# Patient Record
Sex: MALE | Race: BLACK OR AFRICAN AMERICAN | NOT HISPANIC OR LATINO | Employment: OTHER | URBAN - METROPOLITAN AREA
[De-identification: names, ages, dates, MRNs, and addresses within clinical notes are randomized per-mention and may not be internally consistent; named-entity substitution may affect disease eponyms.]

---

## 2017-04-04 ENCOUNTER — TELEPHONE (OUTPATIENT)
Dept: FAMILY MEDICINE CLINIC | Facility: CLINIC | Age: 47
End: 2017-04-04

## 2017-04-04 DIAGNOSIS — I10 ESSENTIAL HYPERTENSION: ICD-10-CM

## 2017-04-04 RX ORDER — AMLODIPINE BESYLATE 5 MG/1
5 TABLET ORAL DAILY
Qty: 90 TABLET | Refills: 1 | Status: SHIPPED | OUTPATIENT
Start: 2017-04-04 | End: 2017-12-13 | Stop reason: SDUPTHER

## 2017-04-04 RX ORDER — CHLORTHALIDONE 25 MG/1
25 TABLET ORAL DAILY
Qty: 90 TABLET | Refills: 1 | Status: SHIPPED | OUTPATIENT
Start: 2017-04-04 | End: 2017-12-13 | Stop reason: SDUPTHER

## 2017-04-04 NOTE — TELEPHONE ENCOUNTER
----- Message from Cal Duggan sent at 4/4/2017  1:20 PM EDT -----  Regarding: med refill  Contact: [phone]  amLODIPine (NORVASC) 5 MG tablet     chlorthalidone (HYGROTEN) 25 MG tablet  Needs refill on these please call and speak with him    LVM for pt to return call  Medication was sent to pharmacy as requested

## 2017-08-24 ENCOUNTER — OFFICE VISIT (OUTPATIENT)
Dept: FAMILY MEDICINE CLINIC | Facility: CLINIC | Age: 47
End: 2017-08-24

## 2017-08-24 VITALS
DIASTOLIC BLOOD PRESSURE: 82 MMHG | HEART RATE: 68 BPM | HEIGHT: 70 IN | BODY MASS INDEX: 24.34 KG/M2 | SYSTOLIC BLOOD PRESSURE: 122 MMHG | WEIGHT: 170 LBS | OXYGEN SATURATION: 96 % | RESPIRATION RATE: 18 BRPM

## 2017-08-24 DIAGNOSIS — K29.30 CHRONIC SUPERFICIAL GASTRITIS WITHOUT BLEEDING: Primary | ICD-10-CM

## 2017-08-24 PROCEDURE — 83013 H PYLORI (C-13) BREATH: CPT | Performed by: FAMILY MEDICINE

## 2017-08-24 PROCEDURE — 99213 OFFICE O/P EST LOW 20 MIN: CPT | Performed by: FAMILY MEDICINE

## 2017-08-24 RX ORDER — MEFLOQUINE HYDROCHLORIDE 250 MG/1
TABLET ORAL
COMMUNITY
Start: 2017-06-12 | End: 2017-08-24

## 2017-08-24 RX ORDER — OMEPRAZOLE 40 MG/1
CAPSULE, DELAYED RELEASE ORAL
Qty: 30 CAPSULE | Refills: 1 | Status: SHIPPED | OUTPATIENT
Start: 2017-08-24 | End: 2018-11-29

## 2017-08-24 NOTE — PROGRESS NOTES
"Subjective   Trent Lemus is a 47 y.o. male.     History of Present Illness   He is accompanied by his wife.  He describes > 6 months of intermittent epigastric discomfort characterized as a \"burning.\"  He has not been able to attribute his symptom to diet choices or activities.  He denies crescendo abdominal pain, n/v/d, melena or jaundice.  He denies associated reflux, dysphagia or odynophagia.  He reports the indigestion lasts a couple of hours.  He has not tried any over the counter acid reducers.  He reports no excessive alcohol intake.  He works out on a daily basis.  He denies retrosternal chest pain, dyspnea, diaphoresis, racing heart beat or pedal edema.    Review of Systems   Constitutional: Negative for chills, diaphoresis and fever.   HENT: Negative for nosebleeds.    Respiratory: Negative for cough and shortness of breath.    Cardiovascular: Negative for chest pain, palpitations and leg swelling.   Gastrointestinal: Negative for abdominal distention, blood in stool, constipation, diarrhea, nausea and vomiting.   Endocrine: Negative for polydipsia, polyphagia and polyuria.   Genitourinary: Negative for difficulty urinating and hematuria.   Musculoskeletal: Negative for arthralgias.   Skin: Negative for rash.   Neurological: Negative for light-headedness and headaches.   Hematological: Does not bruise/bleed easily.   Psychiatric/Behavioral: The patient is not nervous/anxious.        Objective   Physical Exam   Constitutional: He is oriented to person, place, and time. He appears well-developed and well-nourished.   HENT:   Head: Normocephalic and atraumatic.   Eyes: Conjunctivae are normal.   Neck: Neck supple.   Cardiovascular: Normal rate, regular rhythm and normal heart sounds.    Pulmonary/Chest: Effort normal and breath sounds normal.   Abdominal: Soft. Bowel sounds are normal. He exhibits no distension. There is no tenderness. There is no rebound and no guarding.   No epigastric pain illicited with " deep palpation today.   Musculoskeletal: Normal range of motion.   Neurological: He is alert and oriented to person, place, and time.   Skin: Skin is warm and dry.   Psychiatric: He has a normal mood and affect. His behavior is normal. Judgment and thought content normal.   Nursing note and vitals reviewed.      Assessment/Plan   Diagnoses and all orders for this visit:    Chronic superficial gastritis without bleeding  -     H. Pylori Breath Test  -     omeprazole (priLOSEC) 40 MG capsule; Take 1 cap po once daily  - Fiber > 30 grams daily  - Food and symptom diary  - Go to the ED with any sudden changes or concerns.  - RTC in 3 weeks for re-evaluation.

## 2017-08-28 LAB
UREA BREATH TEST QL: NORMAL
UREA BREATH TEST QL: POSITIVE

## 2017-09-29 ENCOUNTER — OFFICE VISIT (OUTPATIENT)
Dept: FAMILY MEDICINE CLINIC | Facility: CLINIC | Age: 47
End: 2017-09-29

## 2017-09-29 VITALS
WEIGHT: 166.8 LBS | HEIGHT: 70 IN | BODY MASS INDEX: 23.88 KG/M2 | HEART RATE: 53 BPM | SYSTOLIC BLOOD PRESSURE: 116 MMHG | RESPIRATION RATE: 18 BRPM | OXYGEN SATURATION: 98 % | DIASTOLIC BLOOD PRESSURE: 80 MMHG

## 2017-09-29 DIAGNOSIS — K29.70 HELICOBACTER PYLORI GASTRITIS: ICD-10-CM

## 2017-09-29 DIAGNOSIS — B96.81 HELICOBACTER PYLORI GASTRITIS: ICD-10-CM

## 2017-09-29 DIAGNOSIS — Z00.00 HEALTHCARE MAINTENANCE: Primary | ICD-10-CM

## 2017-09-29 LAB
25(OH)D3 SERPL-MCNC: 27.8 NG/ML
ALBUMIN SERPL-MCNC: 4.4 G/DL (ref 3.2–4.8)
ALBUMIN/GLOB SERPL: 1.5 G/DL (ref 1.5–2.5)
ALP SERPL-CCNC: 61 U/L (ref 25–100)
ALT SERPL W P-5'-P-CCNC: 35 U/L (ref 7–40)
ANION GAP SERPL CALCULATED.3IONS-SCNC: 8 MMOL/L (ref 3–11)
ARTICHOKE IGE QN: 133 MG/DL (ref 0–130)
AST SERPL-CCNC: 33 U/L (ref 0–33)
BASOPHILS # BLD MANUAL: 0.09 10*3/MM3 (ref 0–0.2)
BASOPHILS NFR BLD AUTO: 2 % (ref 0–1)
BILIRUB BLD-MCNC: NEGATIVE MG/DL
BILIRUB SERPL-MCNC: 1.2 MG/DL (ref 0.3–1.2)
BUN BLD-MCNC: 11 MG/DL (ref 9–23)
BUN/CREAT SERPL: 8.5 (ref 7–25)
CALCIUM SPEC-SCNC: 9.7 MG/DL (ref 8.7–10.4)
CHLORIDE SERPL-SCNC: 97 MMOL/L (ref 99–109)
CHOLEST SERPL-MCNC: 204 MG/DL (ref 0–200)
CLARITY, POC: CLEAR
CO2 SERPL-SCNC: 36 MMOL/L (ref 20–31)
COLOR UR: YELLOW
CREAT BLD-MCNC: 1.3 MG/DL (ref 0.6–1.3)
CRP SERPL-MCNC: <0.01 MG/DL (ref 0–1)
DEPRECATED RDW RBC AUTO: 44.1 FL (ref 37–54)
EOSINOPHIL # BLD MANUAL: 0.09 10*3/MM3 (ref 0.1–0.3)
EOSINOPHIL NFR BLD MANUAL: 2 % (ref 0–3)
ERYTHROCYTE [DISTWIDTH] IN BLOOD BY AUTOMATED COUNT: 13.4 % (ref 11.3–14.5)
GFR SERPL CREATININE-BSD FRML MDRD: 71 ML/MIN/1.73
GLOBULIN UR ELPH-MCNC: 2.9 GM/DL
GLUCOSE BLD-MCNC: 95 MG/DL (ref 70–100)
GLUCOSE UR STRIP-MCNC: NEGATIVE MG/DL
HCT VFR BLD AUTO: 47.2 % (ref 38.9–50.9)
HDLC SERPL-MCNC: 58 MG/DL (ref 40–60)
HGB BLD-MCNC: 15.9 G/DL (ref 13.1–17.5)
HIV1+2 AB SER QL: NORMAL
KETONES UR QL: NEGATIVE
LEUKOCYTE EST, POC: NEGATIVE
LYMPHOCYTES # BLD MANUAL: 2.64 10*3/MM3 (ref 0.6–4.8)
LYMPHOCYTES NFR BLD MANUAL: 2 % (ref 0–12)
LYMPHOCYTES NFR BLD MANUAL: 56 % (ref 24–44)
MCH RBC QN AUTO: 30.5 PG (ref 27–31)
MCHC RBC AUTO-ENTMCNC: 33.7 G/DL (ref 32–36)
MCV RBC AUTO: 90.6 FL (ref 80–99)
MONOCYTES # BLD AUTO: 0.09 10*3/MM3 (ref 0–1)
NEUTROPHILS # BLD AUTO: 1.37 10*3/MM3 (ref 1.5–8.3)
NEUTROPHILS NFR BLD MANUAL: 29 % (ref 41–71)
NITRITE UR-MCNC: NEGATIVE MG/ML
PH UR: 8 [PH] (ref 5–8)
PLAT MORPH BLD: NORMAL
PLATELET # BLD AUTO: 166 10*3/MM3 (ref 150–450)
PMV BLD AUTO: 12.7 FL (ref 6–12)
POTASSIUM BLD-SCNC: 3.8 MMOL/L (ref 3.5–5.5)
PROT SERPL-MCNC: 7.3 G/DL (ref 5.7–8.2)
PROT UR STRIP-MCNC: NEGATIVE MG/DL
PSA SERPL-MCNC: 0.93 NG/ML (ref 0–4)
RBC # BLD AUTO: 5.21 10*6/MM3 (ref 4.2–5.76)
RBC # UR STRIP: NEGATIVE /UL
RBC MORPH BLD: NORMAL
SCAN SLIDE: NORMAL
SODIUM BLD-SCNC: 141 MMOL/L (ref 132–146)
SP GR UR: 1.01 (ref 1–1.03)
TRIGL SERPL-MCNC: 64 MG/DL (ref 0–150)
TSH SERPL DL<=0.05 MIU/L-ACNC: 1.52 MIU/ML (ref 0.35–5.35)
URATE SERPL-MCNC: 5.8 MG/DL (ref 3.7–9.2)
UROBILINOGEN UR QL: NORMAL
VARIANT LYMPHS NFR BLD MANUAL: 9 % (ref 0–5)
WBC MORPH BLD: NORMAL
WBC NRBC COR # BLD: 4.71 10*3/MM3 (ref 3.5–10.8)

## 2017-09-29 PROCEDURE — 85025 COMPLETE CBC W/AUTO DIFF WBC: CPT | Performed by: FAMILY MEDICINE

## 2017-09-29 PROCEDURE — 82306 VITAMIN D 25 HYDROXY: CPT | Performed by: FAMILY MEDICINE

## 2017-09-29 PROCEDURE — 81003 URINALYSIS AUTO W/O SCOPE: CPT | Performed by: FAMILY MEDICINE

## 2017-09-29 PROCEDURE — 84443 ASSAY THYROID STIM HORMONE: CPT | Performed by: FAMILY MEDICINE

## 2017-09-29 PROCEDURE — 85007 BL SMEAR W/DIFF WBC COUNT: CPT | Performed by: FAMILY MEDICINE

## 2017-09-29 PROCEDURE — 99396 PREV VISIT EST AGE 40-64: CPT | Performed by: FAMILY MEDICINE

## 2017-09-29 PROCEDURE — 80053 COMPREHEN METABOLIC PANEL: CPT | Performed by: FAMILY MEDICINE

## 2017-09-29 PROCEDURE — G0432 EIA HIV-1/HIV-2 SCREEN: HCPCS | Performed by: FAMILY MEDICINE

## 2017-09-29 PROCEDURE — 90471 IMMUNIZATION ADMIN: CPT | Performed by: FAMILY MEDICINE

## 2017-09-29 PROCEDURE — 36415 COLL VENOUS BLD VENIPUNCTURE: CPT | Performed by: FAMILY MEDICINE

## 2017-09-29 PROCEDURE — 90686 IIV4 VACC NO PRSV 0.5 ML IM: CPT | Performed by: FAMILY MEDICINE

## 2017-09-29 PROCEDURE — 84153 ASSAY OF PSA TOTAL: CPT | Performed by: FAMILY MEDICINE

## 2017-09-29 PROCEDURE — 84550 ASSAY OF BLOOD/URIC ACID: CPT | Performed by: FAMILY MEDICINE

## 2017-09-29 PROCEDURE — 86140 C-REACTIVE PROTEIN: CPT | Performed by: FAMILY MEDICINE

## 2017-09-29 PROCEDURE — 80061 LIPID PANEL: CPT | Performed by: FAMILY MEDICINE

## 2017-09-29 RX ORDER — LANSOPRAZOLE, AMOXICILLIN, CLARITHROMYCIN 30-500-500
KIT ORAL
Qty: 1 KIT | Refills: 0 | Status: SHIPPED | OUTPATIENT
Start: 2017-09-29 | End: 2017-12-14

## 2017-09-29 NOTE — PROGRESS NOTES
Subjective   Trent Lemus is a 47 y.o. male.     History of Present Illness   He is here for his annual fasting wellness evaluation.  He is amendable to receiving the annual flu shot today.  He is also here to follow up on his positive H. Pylori urea breath test.  He reports improved epigastric discomfort with his prescribed PPI.    Review of Systems   Constitutional: Negative.    HENT: Negative.    Eyes: Negative.    Respiratory: Negative.    Cardiovascular: Negative.    Gastrointestinal: Negative for blood in stool, diarrhea, nausea and vomiting.   Endocrine: Negative.    Genitourinary: Negative.    Musculoskeletal: Negative.    Skin: Negative.    Allergic/Immunologic: Negative.    Neurological: Negative.    Hematological: Negative.    Psychiatric/Behavioral: Negative.        Objective   Physical Exam   Constitutional: He is oriented to person, place, and time. He appears well-developed and well-nourished. He is cooperative.   HENT:   Head: Normocephalic and atraumatic.   Right Ear: Hearing and external ear normal.   Left Ear: Hearing and external ear normal.   Nose: Nose normal.   Mouth/Throat: Uvula is midline, oropharynx is clear and moist and mucous membranes are normal.   Eyes: Conjunctivae and EOM are normal. Pupils are equal, round, and reactive to light. No scleral icterus.   Neck: Trachea normal and normal range of motion. Neck supple. No JVD present. Carotid bruit is not present. No thyromegaly present.   Cardiovascular: Normal rate, regular rhythm, normal heart sounds and intact distal pulses.    Pulmonary/Chest: Effort normal and breath sounds normal.   Abdominal: Soft. Bowel sounds are normal. There is no hepatosplenomegaly. There is no tenderness.   Musculoskeletal: Normal range of motion.   Lymphadenopathy:     He has no cervical adenopathy.   Neurological: He is alert and oriented to person, place, and time. He has normal strength and normal reflexes. No sensory deficit. Gait normal.   Skin: Skin  is warm and dry.   Psychiatric: He has a normal mood and affect. His speech is normal and behavior is normal. Judgment and thought content normal. Cognition and memory are normal.   Nursing note and vitals reviewed.      Assessment/Plan   Diagnoses and all orders for this visit:    Healthcare maintenance  -     POC Urinalysis Dipstick, Automated  -     Comprehensive Metabolic Panel  -     CBC & Differential  -     Lipid Panel  -     TSH  -     Uric Acid  -     C-reactive Protein  -     PSA  -     HIV-1 / O / 2 Ag / Antibody 4th Generation  -     Vitamin D 25 Hydroxy  -     Flu Vaccine Quad PF 3YR+  - Vaccine counseling and current VIS is provided for immunizations administered today.    Helicobacter pylori gastritis  -     CBC & Differential  -     amoxicillin-clarithromycin-lansoprazole (PREVPAC) combo pack; Follow package directions.  - RTC in 3 months for re-evaluation  - Patient education via CDC.gov    The patient is here for a health maintenance visit.  Currently, the patient consumes a healthy diet and has an adequate exercise regimen. Screening lab work is ordered.  Immunizations are updated today.  Advice and education is given regarding nutrition, aerobic exercise, routine dental evaluations, routine eye exams, reproductive health, cardiovascular risk reduction, sunscreen use, self skin examination (annual dermatology evaluations) and seat belt use (general overall safety).  Further recommendations after lab evaluation.  Annual wellness evaluations recommended.

## 2017-12-13 DIAGNOSIS — I10 ESSENTIAL HYPERTENSION: ICD-10-CM

## 2017-12-14 ENCOUNTER — OFFICE VISIT (OUTPATIENT)
Dept: FAMILY MEDICINE CLINIC | Facility: CLINIC | Age: 47
End: 2017-12-14

## 2017-12-14 VITALS
WEIGHT: 171.6 LBS | OXYGEN SATURATION: 98 % | SYSTOLIC BLOOD PRESSURE: 114 MMHG | BODY MASS INDEX: 24.57 KG/M2 | DIASTOLIC BLOOD PRESSURE: 76 MMHG | HEIGHT: 70 IN | HEART RATE: 61 BPM | RESPIRATION RATE: 18 BRPM

## 2017-12-14 DIAGNOSIS — B96.81 HELICOBACTER POSITIVE GASTRITIS: Primary | ICD-10-CM

## 2017-12-14 DIAGNOSIS — K29.70 HELICOBACTER POSITIVE GASTRITIS: Primary | ICD-10-CM

## 2017-12-14 PROCEDURE — 99213 OFFICE O/P EST LOW 20 MIN: CPT | Performed by: FAMILY MEDICINE

## 2017-12-14 PROCEDURE — 83013 H PYLORI (C-13) BREATH: CPT | Performed by: FAMILY MEDICINE

## 2017-12-14 RX ORDER — CHLORTHALIDONE 25 MG/1
25 TABLET ORAL DAILY
Qty: 90 TABLET | Refills: 1 | Status: SHIPPED | OUTPATIENT
Start: 2017-12-14 | End: 2018-07-20 | Stop reason: SDUPTHER

## 2017-12-14 RX ORDER — AMLODIPINE BESYLATE 5 MG/1
5 TABLET ORAL DAILY
Qty: 90 TABLET | Refills: 1 | Status: SHIPPED | OUTPATIENT
Start: 2017-12-14 | End: 2018-07-20 | Stop reason: SDUPTHER

## 2017-12-14 NOTE — PROGRESS NOTES
Subjective   Trent Lemus is a 47 y.o. male.     History of Present Illness   He is here to follow up on his recent diagnosis of Helicobacter pylori gastritis.  He describes compliance with his medication.  He has not taken any PPI over the last 2 weeks.  He describes some intermittent epigastric discomfort.  It last several minutes.  He denies associated reflux, crescendo abdominal pain, dysphagia, odynophagia or melena.  He reports a good appetite.  He is here to recheck his urea breath test.    Review of Systems   Constitutional: Negative for chills and fever.   HENT: Negative for trouble swallowing and voice change.    Respiratory: Negative for cough and stridor.    Cardiovascular: Negative for chest pain.   Gastrointestinal: Positive for nausea. Negative for abdominal pain, blood in stool, diarrhea and vomiting.   Genitourinary: Negative for hematuria.       Objective   Physical Exam   Constitutional: He is oriented to person, place, and time. He appears well-developed and well-nourished.   HENT:   Head: Normocephalic and atraumatic.   Right Ear: Hearing normal.   Left Ear: Hearing normal.   Mouth/Throat: Mucous membranes are normal.   Eyes: Conjunctivae and EOM are normal. Pupils are equal, round, and reactive to light.   Neck: Neck supple. No JVD present. No thyromegaly present.   Cardiovascular: Normal rate, regular rhythm and normal heart sounds.    Pulmonary/Chest: Effort normal and breath sounds normal.   Abdominal: Soft. He exhibits no distension. There is no tenderness. There is no rebound and no guarding.   Musculoskeletal: Normal range of motion.   Neurological: He is alert and oriented to person, place, and time. He has normal strength. No sensory deficit. Gait normal.   Skin: Skin is warm and dry.   Psychiatric: He has a normal mood and affect. His behavior is normal. Judgment and thought content normal. Cognition and memory are normal.   Nursing note and vitals reviewed.      Assessment/Plan    Diagnoses and all orders for this visit:    Helicobacter positive gastritis  -     H. Pylori Breath Test - ordered  - He can start taking Omeprazole 20 mg po once daily  - We discussed a referral to GI if not improved.  - Watch stools for melena  - Further recommendations pending lab result.

## 2017-12-19 LAB — UREA BREATH TEST QL: NEGATIVE

## 2018-04-16 ENCOUNTER — OFFICE VISIT (OUTPATIENT)
Dept: FAMILY MEDICINE CLINIC | Facility: CLINIC | Age: 48
End: 2018-04-16

## 2018-04-16 VITALS
BODY MASS INDEX: 24.08 KG/M2 | HEIGHT: 70 IN | DIASTOLIC BLOOD PRESSURE: 72 MMHG | RESPIRATION RATE: 18 BRPM | TEMPERATURE: 98.4 F | HEART RATE: 64 BPM | SYSTOLIC BLOOD PRESSURE: 116 MMHG | WEIGHT: 168.2 LBS | OXYGEN SATURATION: 98 %

## 2018-04-16 DIAGNOSIS — J06.9 ACUTE URI: Primary | ICD-10-CM

## 2018-04-16 LAB
EXPIRATION DATE: NORMAL
INTERNAL CONTROL: NORMAL
Lab: NORMAL
S PYO AG THROAT QL: NEGATIVE

## 2018-04-16 PROCEDURE — 99213 OFFICE O/P EST LOW 20 MIN: CPT | Performed by: FAMILY MEDICINE

## 2018-04-16 PROCEDURE — 87880 STREP A ASSAY W/OPTIC: CPT | Performed by: FAMILY MEDICINE

## 2018-04-16 RX ORDER — AZELASTINE 1 MG/ML
SPRAY, METERED NASAL
Qty: 1 EACH | Refills: 0 | Status: SHIPPED | OUTPATIENT
Start: 2018-04-16 | End: 2019-10-31

## 2018-05-07 DIAGNOSIS — J30.1 CHRONIC SEASONAL ALLERGIC RHINITIS DUE TO POLLEN: Primary | ICD-10-CM

## 2018-05-07 RX ORDER — FLUTICASONE PROPIONATE 50 MCG
2 SPRAY, SUSPENSION (ML) NASAL DAILY
Qty: 1 BOTTLE | Refills: 3 | Status: SHIPPED | OUTPATIENT
Start: 2018-05-07 | End: 2018-11-29

## 2018-05-07 RX ORDER — MONTELUKAST SODIUM 10 MG/1
10 TABLET ORAL NIGHTLY
Qty: 30 TABLET | Refills: 3 | Status: SHIPPED | OUTPATIENT
Start: 2018-05-07 | End: 2018-11-29

## 2018-05-07 RX ORDER — LORATADINE 10 MG/1
10 TABLET ORAL DAILY
Qty: 30 TABLET | Refills: 3 | Status: SHIPPED | OUTPATIENT
Start: 2018-05-07 | End: 2018-10-02 | Stop reason: SDUPTHER

## 2018-07-20 DIAGNOSIS — I10 ESSENTIAL HYPERTENSION: ICD-10-CM

## 2018-07-20 RX ORDER — CHLORTHALIDONE 25 MG/1
25 TABLET ORAL DAILY
Qty: 90 TABLET | Refills: 1 | Status: SHIPPED | OUTPATIENT
Start: 2018-07-20 | End: 2018-11-29 | Stop reason: SDUPTHER

## 2018-07-20 RX ORDER — AMLODIPINE BESYLATE 5 MG/1
5 TABLET ORAL DAILY
Qty: 90 TABLET | Refills: 1 | Status: SHIPPED | OUTPATIENT
Start: 2018-07-20 | End: 2018-11-29 | Stop reason: SDUPTHER

## 2018-10-02 DIAGNOSIS — J30.1 CHRONIC SEASONAL ALLERGIC RHINITIS DUE TO POLLEN: ICD-10-CM

## 2018-10-02 RX ORDER — LORATADINE 10 MG/1
10 TABLET ORAL DAILY
Qty: 30 TABLET | Refills: 3 | Status: SHIPPED | OUTPATIENT
Start: 2018-10-02 | End: 2019-09-16

## 2018-11-29 ENCOUNTER — OFFICE VISIT (OUTPATIENT)
Dept: FAMILY MEDICINE CLINIC | Facility: CLINIC | Age: 48
End: 2018-11-29

## 2018-11-29 VITALS
SYSTOLIC BLOOD PRESSURE: 110 MMHG | OXYGEN SATURATION: 98 % | HEART RATE: 60 BPM | TEMPERATURE: 98.4 F | RESPIRATION RATE: 18 BRPM | DIASTOLIC BLOOD PRESSURE: 72 MMHG | HEIGHT: 70 IN | WEIGHT: 172.2 LBS | BODY MASS INDEX: 24.65 KG/M2

## 2018-11-29 DIAGNOSIS — I10 ESSENTIAL HYPERTENSION: ICD-10-CM

## 2018-11-29 DIAGNOSIS — Z00.00 HEALTHCARE MAINTENANCE: Primary | ICD-10-CM

## 2018-11-29 LAB
ALBUMIN SERPL-MCNC: 4.39 G/DL (ref 3.2–4.8)
ALBUMIN/GLOB SERPL: 2 G/DL (ref 1.5–2.5)
ALP SERPL-CCNC: 50 U/L (ref 25–100)
ALT SERPL W P-5'-P-CCNC: 35 U/L (ref 7–40)
ANION GAP SERPL CALCULATED.3IONS-SCNC: 6 MMOL/L (ref 3–11)
ARTICHOKE IGE QN: 119 MG/DL (ref 0–130)
AST SERPL-CCNC: 36 U/L (ref 0–33)
BASOPHILS # BLD AUTO: 0.01 10*3/MM3 (ref 0–0.2)
BASOPHILS NFR BLD AUTO: 0.2 % (ref 0–1)
BILIRUB BLD-MCNC: NEGATIVE MG/DL
BILIRUB SERPL-MCNC: 1.2 MG/DL (ref 0.3–1.2)
BUN BLD-MCNC: 16 MG/DL (ref 9–23)
BUN/CREAT SERPL: 12.5 (ref 7–25)
CALCIUM SPEC-SCNC: 9.5 MG/DL (ref 8.7–10.4)
CHLORIDE SERPL-SCNC: 97 MMOL/L (ref 99–109)
CHOLEST SERPL-MCNC: 184 MG/DL (ref 0–200)
CLARITY, POC: CLEAR
CO2 SERPL-SCNC: 34 MMOL/L (ref 20–31)
COLOR UR: YELLOW
CREAT BLD-MCNC: 1.28 MG/DL (ref 0.6–1.3)
CRP SERPL-MCNC: <0.01 MG/DL (ref 0–1)
DEPRECATED RDW RBC AUTO: 44.2 FL (ref 37–54)
EOSINOPHIL # BLD AUTO: 0.19 10*3/MM3 (ref 0–0.3)
EOSINOPHIL NFR BLD AUTO: 4 % (ref 0–3)
ERYTHROCYTE [DISTWIDTH] IN BLOOD BY AUTOMATED COUNT: 13.4 % (ref 11.3–14.5)
GFR SERPL CREATININE-BSD FRML MDRD: 72 ML/MIN/1.73
GLOBULIN UR ELPH-MCNC: 2.2 GM/DL
GLUCOSE BLD-MCNC: 95 MG/DL (ref 70–100)
GLUCOSE UR STRIP-MCNC: NEGATIVE MG/DL
HBA1C MFR BLD: 6.6 % (ref 4.8–5.6)
HCT VFR BLD AUTO: 48.4 % (ref 38.9–50.9)
HCV AB SER DONR QL: NORMAL
HDLC SERPL-MCNC: 52 MG/DL (ref 40–60)
HGB BLD-MCNC: 16.2 G/DL (ref 13.1–17.5)
HIV1+2 AB SER QL: NORMAL
IMM GRANULOCYTES # BLD: 0 10*3/MM3 (ref 0–0.03)
IMM GRANULOCYTES NFR BLD: 0 % (ref 0–0.6)
KETONES UR QL: NEGATIVE
LEUKOCYTE EST, POC: NEGATIVE
LYMPHOCYTES # BLD AUTO: 2.89 10*3/MM3 (ref 0.6–4.8)
LYMPHOCYTES NFR BLD AUTO: 61.5 % (ref 24–44)
MCH RBC QN AUTO: 30.6 PG (ref 27–31)
MCHC RBC AUTO-ENTMCNC: 33.5 G/DL (ref 32–36)
MCV RBC AUTO: 91.3 FL (ref 80–99)
MONOCYTES # BLD AUTO: 0.32 10*3/MM3 (ref 0–1)
MONOCYTES NFR BLD AUTO: 6.8 % (ref 0–12)
NEUTROPHILS # BLD AUTO: 1.29 10*3/MM3 (ref 1.5–8.3)
NEUTROPHILS NFR BLD AUTO: 27.5 % (ref 41–71)
NITRITE UR-MCNC: NEGATIVE MG/ML
PH UR: 7 [PH] (ref 5–8)
PLATELET # BLD AUTO: 185 10*3/MM3 (ref 150–450)
PMV BLD AUTO: 12.9 FL (ref 6–12)
POTASSIUM BLD-SCNC: 3.7 MMOL/L (ref 3.5–5.5)
PROT SERPL-MCNC: 6.6 G/DL (ref 5.7–8.2)
PROT UR STRIP-MCNC: NEGATIVE MG/DL
PSA SERPL-MCNC: 1.15 NG/ML (ref 0–4)
RBC # BLD AUTO: 5.3 10*6/MM3 (ref 4.2–5.76)
RBC # UR STRIP: NEGATIVE /UL
SODIUM BLD-SCNC: 137 MMOL/L (ref 132–146)
SP GR UR: 1.01 (ref 1–1.03)
TRIGL SERPL-MCNC: 52 MG/DL (ref 0–150)
TSH SERPL DL<=0.05 MIU/L-ACNC: 0.96 MIU/ML (ref 0.35–5.35)
URATE SERPL-MCNC: 7 MG/DL (ref 3.7–9.2)
UROBILINOGEN UR QL: NORMAL
WBC NRBC COR # BLD: 4.7 10*3/MM3 (ref 3.5–10.8)

## 2018-11-29 PROCEDURE — 80053 COMPREHEN METABOLIC PANEL: CPT | Performed by: FAMILY MEDICINE

## 2018-11-29 PROCEDURE — 90471 IMMUNIZATION ADMIN: CPT | Performed by: FAMILY MEDICINE

## 2018-11-29 PROCEDURE — 90686 IIV4 VACC NO PRSV 0.5 ML IM: CPT | Performed by: FAMILY MEDICINE

## 2018-11-29 PROCEDURE — 83036 HEMOGLOBIN GLYCOSYLATED A1C: CPT | Performed by: FAMILY MEDICINE

## 2018-11-29 PROCEDURE — 36415 COLL VENOUS BLD VENIPUNCTURE: CPT | Performed by: FAMILY MEDICINE

## 2018-11-29 PROCEDURE — G0432 EIA HIV-1/HIV-2 SCREEN: HCPCS | Performed by: FAMILY MEDICINE

## 2018-11-29 PROCEDURE — 85007 BL SMEAR W/DIFF WBC COUNT: CPT | Performed by: FAMILY MEDICINE

## 2018-11-29 PROCEDURE — 86140 C-REACTIVE PROTEIN: CPT | Performed by: FAMILY MEDICINE

## 2018-11-29 PROCEDURE — 84443 ASSAY THYROID STIM HORMONE: CPT | Performed by: FAMILY MEDICINE

## 2018-11-29 PROCEDURE — 85025 COMPLETE CBC W/AUTO DIFF WBC: CPT | Performed by: FAMILY MEDICINE

## 2018-11-29 PROCEDURE — 81003 URINALYSIS AUTO W/O SCOPE: CPT | Performed by: FAMILY MEDICINE

## 2018-11-29 PROCEDURE — 86803 HEPATITIS C AB TEST: CPT | Performed by: FAMILY MEDICINE

## 2018-11-29 PROCEDURE — 84550 ASSAY OF BLOOD/URIC ACID: CPT | Performed by: FAMILY MEDICINE

## 2018-11-29 PROCEDURE — 93000 ELECTROCARDIOGRAM COMPLETE: CPT | Performed by: FAMILY MEDICINE

## 2018-11-29 PROCEDURE — 99396 PREV VISIT EST AGE 40-64: CPT | Performed by: FAMILY MEDICINE

## 2018-11-29 PROCEDURE — G0103 PSA SCREENING: HCPCS | Performed by: FAMILY MEDICINE

## 2018-11-29 PROCEDURE — 80061 LIPID PANEL: CPT | Performed by: FAMILY MEDICINE

## 2018-11-29 RX ORDER — AMLODIPINE BESYLATE 5 MG/1
5 TABLET ORAL DAILY
Qty: 90 TABLET | Refills: 1 | Status: SHIPPED | OUTPATIENT
Start: 2018-11-29 | End: 2019-09-16 | Stop reason: SDUPTHER

## 2018-11-29 RX ORDER — CHLORTHALIDONE 25 MG/1
25 TABLET ORAL DAILY
Qty: 90 TABLET | Refills: 1 | Status: SHIPPED | OUTPATIENT
Start: 2018-11-29 | End: 2019-09-16 | Stop reason: SDUPTHER

## 2018-11-29 NOTE — PROGRESS NOTES
Subjective   Trent Lemus is a 48 y.o. male.     History of Present Illness   He is here for his annual fasting wellness evaluation.  He is amendable to updating his annual flu shot.  He voices no acute symptoms.  He is needing a refill of his blood pressure medications.  He is tolerating them well with no adverse events.  He reports good blood pressure control.    Review of Systems   Constitutional: Negative.    HENT: Negative.    Eyes: Negative.    Respiratory: Negative.    Cardiovascular: Negative.    Gastrointestinal: Negative.    Endocrine: Negative.    Genitourinary: Negative.    Musculoskeletal: Negative.    Skin: Negative.    Allergic/Immunologic: Negative.    Neurological: Negative.    Hematological: Negative.    Psychiatric/Behavioral: Negative.        Objective   Physical Exam   Constitutional: He is oriented to person, place, and time. He appears well-developed and well-nourished. He is cooperative.   HENT:   Head: Normocephalic and atraumatic.   Right Ear: Hearing and external ear normal.   Left Ear: Hearing and external ear normal.   Nose: Nose normal.   Mouth/Throat: Uvula is midline, oropharynx is clear and moist and mucous membranes are normal.   Eyes: Conjunctivae and EOM are normal. Pupils are equal, round, and reactive to light. No scleral icterus.   Neck: Trachea normal and normal range of motion. Neck supple. No JVD present. Carotid bruit is not present. No thyromegaly present.   Cardiovascular: Normal rate, regular rhythm, normal heart sounds and intact distal pulses.   Pulmonary/Chest: Effort normal and breath sounds normal.   Abdominal: Soft. Bowel sounds are normal. There is no hepatosplenomegaly. There is no tenderness.   Musculoskeletal: Normal range of motion.   Lymphadenopathy:     He has no cervical adenopathy.   Neurological: He is alert and oriented to person, place, and time. He has normal strength and normal reflexes. No sensory deficit. Gait normal.   Skin: Skin is warm and dry.    Psychiatric: He has a normal mood and affect. His speech is normal and behavior is normal. Judgment and thought content normal. Cognition and memory are normal.   Nursing note and vitals reviewed.      ECG 12 Lead  Date/Time: 11/29/2018 10:33 AM  Performed by: Winston Washington MD  Authorized by: Winston Washington MD   Comparison: not compared with previous ECG   Previous ECG: no previous ECG available  Rhythm: sinus bradycardia  Rate: bradycardic  BPM: 54  Conduction: conduction normal  ST Segments: ST segments normal  T Waves: T waves normal  QRS axis: normal  Clinical impression: normal ECG            Assessment/Plan   Diagnoses and all orders for this visit:    Healthcare maintenance  -     POC Urinalysis Dipstick, Automated  -     Comprehensive Metabolic Panel  -     CBC & Differential  -     Lipid Panel  -     TSH  -     Uric Acid  -     C-reactive Protein  -     PSA Screen  -     HIV-1 / O / 2 Ag / Antibody 4th Generation  -     Hepatitis C Antibody  -     Hemoglobin A1c  -     Fluarix/Fluzone/Afluria Quad>6 Months  - Vaccine counseling and current VIS is provided for immunizations administered today.    Hypertension  -     ECG 12 Lead  -     amLODIPine (NORVASC) 5 MG tablet; Take 1 tablet by mouth Daily.  -     chlorthalidone (HYGROTON) 25 MG tablet; Take 1 tablet by mouth Daily.  -     POC Urinalysis Dipstick, Automated  -     Comprehensive Metabolic Panel  -     Lipid Panel  - Healthy heart diet  - Daily aerobic exercise  - Routine blood pressure monitoring  - [place] Heart Disease and Stroke Prevention Task Force pamphlet reviewed and administered.    The patient is here for a health maintenance visit.  Currently, the patient consumes a healthy diet and has an adequate exercise regimen. Screening lab work is ordered.  Immunizations are updated today.  Advice and education is given regarding nutrition, aerobic exercise, routine dental evaluations, routine eye exams, reproductive health, cardiovascular risk  reduction, sunscreen use, self skin examination (annual dermatology evaluations) and seat belt use (general overall safety).  Further recommendations after lab evaluation.  Annual wellness evaluations recommended.

## 2019-09-15 DIAGNOSIS — I10 ESSENTIAL HYPERTENSION: ICD-10-CM

## 2019-09-15 RX ORDER — AMLODIPINE BESYLATE 5 MG/1
TABLET ORAL
Qty: 90 TABLET | Refills: 1 | Status: CANCELLED | OUTPATIENT
Start: 2019-09-15

## 2019-09-16 ENCOUNTER — OFFICE VISIT (OUTPATIENT)
Dept: FAMILY MEDICINE CLINIC | Facility: CLINIC | Age: 49
End: 2019-09-16

## 2019-09-16 VITALS
BODY MASS INDEX: 23.31 KG/M2 | DIASTOLIC BLOOD PRESSURE: 78 MMHG | WEIGHT: 162.8 LBS | TEMPERATURE: 97.9 F | HEIGHT: 70 IN | HEART RATE: 62 BPM | SYSTOLIC BLOOD PRESSURE: 124 MMHG | OXYGEN SATURATION: 98 %

## 2019-09-16 DIAGNOSIS — I10 ESSENTIAL HYPERTENSION: ICD-10-CM

## 2019-09-16 DIAGNOSIS — K57.92 DIVERTICULITIS: Primary | ICD-10-CM

## 2019-09-16 PROCEDURE — 99214 OFFICE O/P EST MOD 30 MIN: CPT | Performed by: FAMILY MEDICINE

## 2019-09-16 RX ORDER — CHLORTHALIDONE 25 MG/1
25 TABLET ORAL DAILY
Qty: 90 TABLET | Refills: 0 | Status: SHIPPED | OUTPATIENT
Start: 2019-09-16 | End: 2019-10-31 | Stop reason: SDUPTHER

## 2019-09-16 RX ORDER — CIPROFLOXACIN 500 MG/1
500 TABLET, FILM COATED ORAL 2 TIMES DAILY
Qty: 20 TABLET | Refills: 0 | Status: SHIPPED | OUTPATIENT
Start: 2019-09-16 | End: 2019-10-31

## 2019-09-16 RX ORDER — METRONIDAZOLE 500 MG/1
500 TABLET ORAL 3 TIMES DAILY
Qty: 30 TABLET | Refills: 0 | Status: SHIPPED | OUTPATIENT
Start: 2019-09-16 | End: 2019-10-31

## 2019-09-16 RX ORDER — AMLODIPINE BESYLATE 5 MG/1
5 TABLET ORAL DAILY
Qty: 90 TABLET | Refills: 0 | Status: SHIPPED | OUTPATIENT
Start: 2019-09-16 | End: 2019-10-31 | Stop reason: SDUPTHER

## 2019-09-16 NOTE — PROGRESS NOTES
"Subjective   Trent Lemus is a 49 y.o. male.     History of Present Illness   The patient describes a couple of days of pain located to the LLQ area.  The patient recalls no injury or trauma.  The patient reports on Saturday night after running a marathon experiencing sharp, colicky pain to his LLQ area.  It radiates to his groin.  He denies associated fever, chills, n/v/d or blood in stool.  He reports going home after the race and eating a large quantity of red beans & rice.  A few hours later the sharp pain started.  It waxed and waned.  He was able to sleep.  The next day he ran three miles without difficulty.  He describes a normal bowel movement.  Later that evening his pain started again but \"not as bad.\"  The pain is rated as a 5/10.   He denies associated dysuria, gross hematuria or skin eruptions. His last colonoscopy was in 2016 with no diverticulosis noted.  Today he is also needing a refill of his blood pressure medications.    Review of Systems   Constitutional: Negative for chills, diaphoresis, fatigue, fever and unexpected weight change.   HENT: Negative for trouble swallowing.    Gastrointestinal: Positive for abdominal pain. Negative for anal bleeding, blood in stool, constipation, diarrhea, nausea and vomiting.   Genitourinary: Negative for difficulty urinating, discharge, dysuria and hematuria.       Objective   Physical Exam   Constitutional: He is oriented to person, place, and time. He appears well-developed and well-nourished.   HENT:   Head: Normocephalic and atraumatic.   Right Ear: Hearing normal.   Left Ear: Hearing normal.   Mouth/Throat: Mucous membranes are normal.   Eyes: Conjunctivae and EOM are normal. Pupils are equal, round, and reactive to light.   Neck: Neck supple. No JVD present. No thyromegaly present.   Cardiovascular: Normal rate, regular rhythm and normal heart sounds.   Pulmonary/Chest: Effort normal and breath sounds normal.   Abdominal: Soft. Bowel sounds are normal. " There is no tenderness.   Musculoskeletal: Normal range of motion.   Neurological: He is alert and oriented to person, place, and time. He has normal strength. No sensory deficit. Gait normal.   Skin: Skin is warm and dry.   Psychiatric: He has a normal mood and affect. His behavior is normal. Judgment and thought content normal. Cognition and memory are normal.   Nursing note and vitals reviewed.      Assessment/Plan   Diagnoses and all orders for this visit:    Diverticulitis  -     ciprofloxacin (CIPRO) 500 MG tablet; Take 1 tablet by mouth 2 (Two) Times a Day.  -     metroNIDAZOLE (FLAGYL) 500 MG tablet; Take 1 tablet by mouth 3 (Three) Times a Day.  - Adequate hydration daily  - Probiotic daily  - Fiber > 35 grams daily  - Go to the ED with any increase in severity or sudden changes.    Hypertension  -     chlorthalidone (HYGROTON) 25 MG tablet; Take 1 tablet by mouth Daily.  -     amLODIPine (NORVASC) 5 MG tablet; Take 1 tablet by mouth Daily.  - Healthy heart diet / DASH diet  - Low dose aspirin once daily  - Low sodium diet  - Daily aerobic exercise > 40' > 3 x / week  - Routine blood pressure monitoring  - [place] Heart Disease and Stroke Prevention Task Force pamphlet reviewed and administered.    RTC in November or sooner for annual fasting wellness evaluation.

## 2019-10-25 DIAGNOSIS — I10 ESSENTIAL HYPERTENSION: ICD-10-CM

## 2019-10-29 RX ORDER — CHLORTHALIDONE 25 MG/1
TABLET ORAL
Qty: 90 TABLET | Refills: 1 | OUTPATIENT
Start: 2019-10-29

## 2019-10-31 ENCOUNTER — OFFICE VISIT (OUTPATIENT)
Dept: FAMILY MEDICINE CLINIC | Facility: CLINIC | Age: 49
End: 2019-10-31

## 2019-10-31 VITALS
OXYGEN SATURATION: 97 % | WEIGHT: 165 LBS | BODY MASS INDEX: 23.62 KG/M2 | RESPIRATION RATE: 18 BRPM | DIASTOLIC BLOOD PRESSURE: 70 MMHG | HEART RATE: 70 BPM | SYSTOLIC BLOOD PRESSURE: 122 MMHG | HEIGHT: 70 IN | TEMPERATURE: 97.5 F

## 2019-10-31 DIAGNOSIS — I10 ESSENTIAL HYPERTENSION: Primary | ICD-10-CM

## 2019-10-31 DIAGNOSIS — Z00.00 HEALTHCARE MAINTENANCE: ICD-10-CM

## 2019-10-31 DIAGNOSIS — Z23 NEED FOR INFLUENZA VACCINATION: ICD-10-CM

## 2019-10-31 PROCEDURE — 90471 IMMUNIZATION ADMIN: CPT | Performed by: NURSE PRACTITIONER

## 2019-10-31 PROCEDURE — 99213 OFFICE O/P EST LOW 20 MIN: CPT | Performed by: NURSE PRACTITIONER

## 2019-10-31 PROCEDURE — 90674 CCIIV4 VAC NO PRSV 0.5 ML IM: CPT | Performed by: NURSE PRACTITIONER

## 2019-10-31 RX ORDER — AMLODIPINE BESYLATE 5 MG/1
5 TABLET ORAL DAILY
Qty: 90 TABLET | Refills: 3 | Status: SHIPPED | OUTPATIENT
Start: 2019-10-31 | End: 2020-12-09 | Stop reason: SDUPTHER

## 2019-10-31 RX ORDER — CHLORTHALIDONE 25 MG/1
25 TABLET ORAL DAILY
Qty: 90 TABLET | Refills: 3 | Status: SHIPPED | OUTPATIENT
Start: 2019-10-31 | End: 2020-12-17 | Stop reason: SDUPTHER

## 2019-10-31 NOTE — PROGRESS NOTES
"Trent Lemus is a 49 y.o. male who presents for follow up of hypertension.    Chief Complaint   Patient presents with   • Establish Care       Hypertension   This is a chronic problem. The current episode started more than 1 year ago. The problem has been gradually improving since onset. The problem is controlled. Pertinent negatives include no anxiety, blurred vision, chest pain, headaches, malaise/fatigue, neck pain, orthopnea, palpitations, peripheral edema, PND, shortness of breath or sweats. There are no associated agents to hypertension. Risk factors for coronary artery disease include male gender. Past treatments include ACE inhibitors, calcium channel blockers and diuretics. Current antihypertension treatment includes calcium channel blockers and diuretics. The current treatment provides significant improvement. There are no compliance problems.          No Known Allergies    Health Maintenance   Topic Date Due   • INFLUENZA VACCINE  08/01/2019   • ZOSTER VACCINE (1 of 2) 12/08/2019 (Originally 5/5/2018)   • ANNUAL PHYSICAL  11/30/2019   • COLONOSCOPY  02/28/2026   • TDAP/TD VACCINES (3 - Td) 07/12/2026        ROS    Review of Systems   Constitutional: Negative for malaise/fatigue.   Eyes: Negative for blurred vision.   Respiratory: Negative for chest tightness and shortness of breath.    Cardiovascular: Negative for chest pain, palpitations, orthopnea, leg swelling and PND.   Musculoskeletal: Negative for neck pain.   All other systems reviewed and are negative.      Vitals:    10/31/19 1511   BP: 122/70   Pulse: 70   Resp: 18   Temp: 97.5 °F (36.4 °C)   TempSrc: Temporal   SpO2: 97%   Weight: 74.8 kg (165 lb)   Height: 177.8 cm (70\")         Current Outpatient Medications:   •  amLODIPine (NORVASC) 5 MG tablet, Take 1 tablet by mouth Daily., Disp: 90 tablet, Rfl: 3  •  chlorthalidone (HYGROTON) 25 MG tablet, Take 1 tablet by mouth Daily., Disp: 90 tablet, Rfl: 3    PE    Physical Exam   Constitutional: He " is oriented to person, place, and time. He appears well-developed and well-nourished.   HENT:   Head: Normocephalic and atraumatic.   Cardiovascular: Normal rate, regular rhythm and normal heart sounds. Exam reveals no gallop and no friction rub.   No murmur heard.  Pulmonary/Chest: Effort normal and breath sounds normal. No stridor. No respiratory distress. He has no wheezes. He has no rales.   Neurological: He is alert and oriented to person, place, and time.   Skin: Skin is warm and dry.   Psychiatric: He has a normal mood and affect. His behavior is normal. Judgment and thought content normal.   Nursing note and vitals reviewed.       A/P    Problem List Items Addressed This Visit     None      Visit Diagnoses     Hypertension    -  Primary    Relevant Medications    chlorthalidone (HYGROTON) 25 MG tablet    amLODIPine (NORVASC) 5 MG tablet    Need for influenza vaccination        Relevant Orders    Flucelvax Quad=>4Years ([id number]) (Completed)    Healthcare maintenance        Relevant Orders    CBC Auto Differential    Comprehensive Metabolic Panel    TSH    Hemoglobin A1c    Lipid Panel With / Chol / HDL Ratio    Vitamin D 1,25 Dihydroxy    Vitamin B12    PSA Screen    Flucelvax Quad=>4Years ([id number]) (Completed)          Plan of care reviewed with patient at the conclusion of today's visit. Education was provided regarding diagnosis, management and any prescribed or recommended OTC medications.  Patient verbalizes understanding of and agreement with management plan.    Return in about 2 months (around 12/31/2019) for Annual.     MARCK Richardson

## 2019-12-02 ENCOUNTER — LAB (OUTPATIENT)
Dept: FAMILY MEDICINE CLINIC | Facility: CLINIC | Age: 49
End: 2019-12-02

## 2019-12-02 DIAGNOSIS — Z00.00 HEALTHCARE MAINTENANCE: ICD-10-CM

## 2019-12-02 LAB
ALBUMIN SERPL-MCNC: 4.6 G/DL (ref 3.5–5.2)
ALBUMIN/GLOB SERPL: 1.4 G/DL
ALP SERPL-CCNC: 47 U/L (ref 39–117)
ALT SERPL W P-5'-P-CCNC: 24 U/L (ref 1–41)
ANION GAP SERPL CALCULATED.3IONS-SCNC: 12.4 MMOL/L (ref 5–15)
AST SERPL-CCNC: 32 U/L (ref 1–40)
BILIRUB SERPL-MCNC: 1.6 MG/DL (ref 0.2–1.2)
BUN BLD-MCNC: 17 MG/DL (ref 6–20)
BUN/CREAT SERPL: 12 (ref 7–25)
CALCIUM SPEC-SCNC: 10 MG/DL (ref 8.6–10.5)
CHLORIDE SERPL-SCNC: 96 MMOL/L (ref 98–107)
CHOLEST SERPL-MCNC: 188 MG/DL (ref 0–200)
CO2 SERPL-SCNC: 32.6 MMOL/L (ref 22–29)
CREAT BLD-MCNC: 1.42 MG/DL (ref 0.76–1.27)
GFR SERPL CREATININE-BSD FRML MDRD: 64 ML/MIN/1.73
GLOBULIN UR ELPH-MCNC: 3.4 GM/DL
GLUCOSE BLD-MCNC: 84 MG/DL (ref 65–99)
HBA1C MFR BLD: 5.8 % (ref 4.8–5.6)
HDLC SERPL QL: 2.94
HDLC SERPL-MCNC: 64 MG/DL (ref 40–60)
LDLC SERPL CALC-MCNC: 114 MG/DL (ref 0–100)
POTASSIUM BLD-SCNC: 3.9 MMOL/L (ref 3.5–5.2)
PROT SERPL-MCNC: 8 G/DL (ref 6–8.5)
PSA SERPL-MCNC: 1.21 NG/ML (ref 0–4)
SODIUM BLD-SCNC: 141 MMOL/L (ref 136–145)
TRIGL SERPL-MCNC: 52 MG/DL (ref 0–150)
TSH SERPL DL<=0.05 MIU/L-ACNC: 1.82 UIU/ML (ref 0.27–4.2)
VIT B12 BLD-MCNC: 962 PG/ML (ref 211–946)
VLDLC SERPL-MCNC: 10.4 MG/DL (ref 5–40)

## 2019-12-02 PROCEDURE — G0103 PSA SCREENING: HCPCS | Performed by: NURSE PRACTITIONER

## 2019-12-02 PROCEDURE — 84443 ASSAY THYROID STIM HORMONE: CPT | Performed by: NURSE PRACTITIONER

## 2019-12-02 PROCEDURE — 82652 VIT D 1 25-DIHYDROXY: CPT | Performed by: NURSE PRACTITIONER

## 2019-12-02 PROCEDURE — 82607 VITAMIN B-12: CPT | Performed by: NURSE PRACTITIONER

## 2019-12-02 PROCEDURE — 85025 COMPLETE CBC W/AUTO DIFF WBC: CPT | Performed by: NURSE PRACTITIONER

## 2019-12-02 PROCEDURE — 83036 HEMOGLOBIN GLYCOSYLATED A1C: CPT | Performed by: NURSE PRACTITIONER

## 2019-12-02 PROCEDURE — 80061 LIPID PANEL: CPT | Performed by: NURSE PRACTITIONER

## 2019-12-02 PROCEDURE — 80053 COMPREHEN METABOLIC PANEL: CPT | Performed by: NURSE PRACTITIONER

## 2019-12-03 LAB
DEPRECATED RDW RBC AUTO: 45.7 FL (ref 37–54)
EOSINOPHIL # BLD MANUAL: 0.12 10*3/MM3 (ref 0–0.4)
EOSINOPHIL NFR BLD MANUAL: 3 % (ref 0.3–6.2)
ERYTHROCYTE [DISTWIDTH] IN BLOOD BY AUTOMATED COUNT: 13.5 % (ref 12.3–15.4)
HCT VFR BLD AUTO: 49.8 % (ref 37.5–51)
HGB BLD-MCNC: 16.3 G/DL (ref 13–17.7)
LYMPHOCYTES # BLD MANUAL: 1.49 10*3/MM3 (ref 0.7–3.1)
LYMPHOCYTES NFR BLD MANUAL: 38 % (ref 19.6–45.3)
LYMPHOCYTES NFR BLD MANUAL: 6 % (ref 5–12)
MCH RBC QN AUTO: 29.8 PG (ref 26.6–33)
MCHC RBC AUTO-ENTMCNC: 32.7 G/DL (ref 31.5–35.7)
MCV RBC AUTO: 91 FL (ref 79–97)
MONOCYTES # BLD AUTO: 0.24 10*3/MM3 (ref 0.1–0.9)
NEUTROPHILS # BLD AUTO: 1.45 10*3/MM3 (ref 1.7–7)
NEUTROPHILS NFR BLD MANUAL: 37 % (ref 42.7–76)
PLAT MORPH BLD: NORMAL
PLATELET # BLD AUTO: 165 10*3/MM3 (ref 140–450)
PMV BLD AUTO: 12.8 FL (ref 6–12)
RBC # BLD AUTO: 5.47 10*6/MM3 (ref 4.14–5.8)
RBC MORPH BLD: NORMAL
VARIANT LYMPHS NFR BLD MANUAL: 16 % (ref 0–5)
WBC MORPH BLD: NORMAL
WBC NRBC COR # BLD: 3.93 10*3/MM3 (ref 3.4–10.8)

## 2019-12-04 LAB — 1,25(OH)2D3 SERPL-MCNC: 37 PG/ML (ref 19.9–79.3)

## 2019-12-05 ENCOUNTER — TELEPHONE (OUTPATIENT)
Dept: FAMILY MEDICINE CLINIC | Facility: CLINIC | Age: 49
End: 2019-12-05

## 2019-12-05 NOTE — TELEPHONE ENCOUNTER
Patient advised of abnormal labs per on call provider and needs to follow up soon to discuss. Advised some abnormal kidney functions. He stated he had seen them in the past for a work up but came out as nothing.

## 2019-12-09 ENCOUNTER — OFFICE VISIT (OUTPATIENT)
Dept: FAMILY MEDICINE CLINIC | Facility: CLINIC | Age: 49
End: 2019-12-09

## 2019-12-09 VITALS
DIASTOLIC BLOOD PRESSURE: 76 MMHG | OXYGEN SATURATION: 99 % | HEART RATE: 72 BPM | BODY MASS INDEX: 23.77 KG/M2 | RESPIRATION RATE: 16 BRPM | TEMPERATURE: 97.7 F | SYSTOLIC BLOOD PRESSURE: 124 MMHG | WEIGHT: 166 LBS | HEIGHT: 70 IN

## 2019-12-09 DIAGNOSIS — I10 ESSENTIAL HYPERTENSION: Primary | ICD-10-CM

## 2019-12-09 PROCEDURE — 99213 OFFICE O/P EST LOW 20 MIN: CPT | Performed by: FAMILY MEDICINE

## 2019-12-09 NOTE — PROGRESS NOTES
Trent Lemus is a 49 y.o. male who presents today for Follow-up (discuss labs)      He is here today to follow up for his most recent lab draw on 12/2/19. He states that he was contacted last thursday about his lab draw and was instructed to follow up in person to discuss these labs, and in particular, an elevation in his creatinine. Overall he states that he is not in distress about his results, but rather is following per request from the Thursday phone call. He reports being well controlled on current therapies and would like to discuss if or when he can cease taking medications for his BP. He states that he is compliant with his current medications and denies new onset A/E. He denies chest pain, nausea, vomiting, SOB or fatigue.        Review of Systems   Constitutional: Negative for activity change, appetite change, chills, diaphoresis, fatigue, fever, unexpected weight gain and unexpected weight loss.   Respiratory: Negative for apnea, cough, choking, chest tightness, shortness of breath, wheezing and stridor.    Cardiovascular: Negative for chest pain, palpitations and leg swelling.   Skin: Negative for color change, dry skin, pallor, rash, skin lesions and bruise.   Neurological: Negative for dizziness, seizures, syncope, speech difficulty, numbness, headache, memory problem and confusion.        The following portions of the patient's history were reviewed and updated as appropriate: allergies, current medications, past family history, past medical history, past social history, past surgical history and problem list.    Current Outpatient Medications on File Prior to Visit   Medication Sig Dispense Refill   • amLODIPine (NORVASC) 5 MG tablet Take 1 tablet by mouth Daily. 90 tablet 3   • chlorthalidone (HYGROTON) 25 MG tablet Take 1 tablet by mouth Daily. 90 tablet 3     No current facility-administered medications on file prior to visit.        No Known Allergies     Visit Vitals  /76   Pulse 72  "  Temp 97.7 °F (36.5 °C)   Resp 16   Ht 177.8 cm (70\")   Wt 75.3 kg (166 lb)   SpO2 99%   BMI 23.82 kg/m²        Physical Exam   Constitutional: He is oriented to person, place, and time. He appears well-developed and well-nourished. No distress.   HENT:   Head: Normocephalic and atraumatic.   Neck: Normal range of motion. Neck supple. No tracheal deviation present. No thyromegaly present.   Cardiovascular: Normal rate, regular rhythm, normal heart sounds and intact distal pulses. Exam reveals no gallop and no friction rub.   No murmur heard.  Pulmonary/Chest: Effort normal and breath sounds normal. No stridor. No respiratory distress. He has no wheezes. He has no rales. He exhibits no tenderness.   Abdominal: Soft. Bowel sounds are normal. He exhibits no distension and no mass. There is no tenderness. There is no rebound and no guarding. No hernia.   Neurological: He is alert and oriented to person, place, and time. Coordination normal.   Skin: Skin is warm and dry. Capillary refill takes less than 2 seconds. No rash noted. He is not diaphoretic. No erythema. No pallor.   Psychiatric: He has a normal mood and affect. His behavior is normal. Judgment and thought content normal.        Results for orders placed or performed in visit on 12/02/19   PSA Screen   Result Value Ref Range    PSA 1.210 0.000 - 4.000 ng/mL   Vitamin B12   Result Value Ref Range    Vitamin B-12 962 (H) 211 - 946 pg/mL   Vitamin D 1,25 Dihydroxy   Result Value Ref Range    1,25-Dihydroxy, Vitamin D 37.0 19.9 - 79.3 pg/mL   Lipid Panel With / Chol / HDL Ratio   Result Value Ref Range    Total Cholesterol 188 0 - 200 mg/dL    Triglycerides 52 0 - 150 mg/dL    HDL Cholesterol 64 (H) 40 - 60 mg/dL    LDL Cholesterol  114 (H) 0 - 100 mg/dL    VLDL Cholesterol 10.4 5 - 40 mg/dL    Chol/HDL Ratio 2.94    Hemoglobin A1c   Result Value Ref Range    Hemoglobin A1C 5.80 (H) 4.80 - 5.60 %   TSH   Result Value Ref Range    TSH 1.820 0.270 - 4.200 uIU/mL "   Comprehensive Metabolic Panel   Result Value Ref Range    Glucose 84 65 - 99 mg/dL    BUN 17 6 - 20 mg/dL    Creatinine 1.42 (H) 0.76 - 1.27 mg/dL    Sodium 141 136 - 145 mmol/L    Potassium 3.9 3.5 - 5.2 mmol/L    Chloride 96 (L) 98 - 107 mmol/L    CO2 32.6 (H) 22.0 - 29.0 mmol/L    Calcium 10.0 8.6 - 10.5 mg/dL    Total Protein 8.0 6.0 - 8.5 g/dL    Albumin 4.60 3.50 - 5.20 g/dL    ALT (SGPT) 24 1 - 41 U/L    AST (SGOT) 32 1 - 40 U/L    Alkaline Phosphatase 47 39 - 117 U/L    Total Bilirubin 1.6 (H) 0.2 - 1.2 mg/dL    eGFR  African Amer 64 >60 mL/min/1.73    Globulin 3.4 gm/dL    A/G Ratio 1.4 g/dL    BUN/Creatinine Ratio 12.0 7.0 - 25.0    Anion Gap 12.4 5.0 - 15.0 mmol/L   CBC Auto Differential   Result Value Ref Range    WBC 3.93 3.40 - 10.80 10*3/mm3    RBC 5.47 4.14 - 5.80 10*6/mm3    Hemoglobin 16.3 13.0 - 17.7 g/dL    Hematocrit 49.8 37.5 - 51.0 %    MCV 91.0 79.0 - 97.0 fL    MCH 29.8 26.6 - 33.0 pg    MCHC 32.7 31.5 - 35.7 g/dL    RDW 13.5 12.3 - 15.4 %    RDW-SD 45.7 37.0 - 54.0 fl    MPV 12.8 (H) 6.0 - 12.0 fL    Platelets 165 140 - 450 10*3/mm3   Manual Differential   Result Value Ref Range    Neutrophil % 37.0 (L) 42.7 - 76.0 %    Lymphocyte % 38.0 19.6 - 45.3 %    Monocyte % 6.0 5.0 - 12.0 %    Eosinophil % 3.0 0.3 - 6.2 %    Atypical Lymphocyte % 16.0 (H) 0.0 - 5.0 %    Neutrophils Absolute 1.45 (L) 1.70 - 7.00 10*3/mm3    Lymphocytes Absolute 1.49 0.70 - 3.10 10*3/mm3    Monocytes Absolute 0.24 0.10 - 0.90 10*3/mm3    Eosinophils Absolute 0.12 0.00 - 0.40 10*3/mm3    RBC Morphology Normal Normal    WBC Morphology Normal Normal    Platelet Morphology Normal Normal        Problems Addressed this Visit        Cardiovascular and Mediastinum    Hypertension - Primary     Hypertension is unchanged.  Continue current treatment regimen.  Blood pressure will be reassessed 1 year.               Return in about 1 year (around 12/9/2020) for Annual.    Parts of this office note have been dictated by voice  recognition software. Grammatical and/or spelling errors may be present.    Marino Ohcoa MD   12/9/2019

## 2019-12-09 NOTE — ASSESSMENT & PLAN NOTE
Hypertension is unchanged.  Continue current treatment regimen.  Blood pressure will be reassessed 1 year.

## 2020-12-09 DIAGNOSIS — I10 ESSENTIAL HYPERTENSION: ICD-10-CM

## 2020-12-09 RX ORDER — AMLODIPINE BESYLATE 5 MG/1
5 TABLET ORAL DAILY
Qty: 90 TABLET | Refills: 3 | Status: SHIPPED | OUTPATIENT
Start: 2020-12-09 | End: 2021-12-23 | Stop reason: SDUPTHER

## 2020-12-17 ENCOUNTER — OFFICE VISIT (OUTPATIENT)
Dept: FAMILY MEDICINE CLINIC | Facility: CLINIC | Age: 50
End: 2020-12-17

## 2020-12-17 VITALS
OXYGEN SATURATION: 99 % | HEART RATE: 61 BPM | DIASTOLIC BLOOD PRESSURE: 74 MMHG | TEMPERATURE: 96.9 F | WEIGHT: 168.6 LBS | BODY MASS INDEX: 24.14 KG/M2 | HEIGHT: 70 IN | RESPIRATION RATE: 14 BRPM | SYSTOLIC BLOOD PRESSURE: 116 MMHG

## 2020-12-17 DIAGNOSIS — Z12.5 SCREENING FOR PROSTATE CANCER: ICD-10-CM

## 2020-12-17 DIAGNOSIS — N52.03 COMBINED ARTERIAL INSUFFICIENCY AND CORPORO-VENOUS OCCLUSIVE ERECTILE DYSFUNCTION: ICD-10-CM

## 2020-12-17 DIAGNOSIS — Z00.00 WELL ADULT EXAM: Primary | ICD-10-CM

## 2020-12-17 DIAGNOSIS — Z23 NEED FOR SHINGLES VACCINE: ICD-10-CM

## 2020-12-17 DIAGNOSIS — I10 ESSENTIAL HYPERTENSION: ICD-10-CM

## 2020-12-17 LAB
ALBUMIN SERPL-MCNC: 4.6 G/DL (ref 3.5–5.2)
ALBUMIN/GLOB SERPL: 1.5 G/DL
ALP SERPL-CCNC: 48 U/L (ref 39–117)
ALT SERPL W P-5'-P-CCNC: 22 U/L (ref 1–41)
ANION GAP SERPL CALCULATED.3IONS-SCNC: 9 MMOL/L (ref 5–15)
AST SERPL-CCNC: 28 U/L (ref 1–40)
BASOPHILS # BLD MANUAL: 0.04 10*3/MM3 (ref 0–0.2)
BASOPHILS NFR BLD AUTO: 1 % (ref 0–1.5)
BILIRUB SERPL-MCNC: 1.5 MG/DL (ref 0–1.2)
BUN SERPL-MCNC: 14 MG/DL (ref 6–20)
BUN/CREAT SERPL: 11.3 (ref 7–25)
BURR CELLS BLD QL SMEAR: ABNORMAL
CALCIUM SPEC-SCNC: 9.7 MG/DL (ref 8.6–10.5)
CHLORIDE SERPL-SCNC: 96 MMOL/L (ref 98–107)
CHOLEST SERPL-MCNC: 180 MG/DL (ref 0–200)
CO2 SERPL-SCNC: 33 MMOL/L (ref 22–29)
CREAT SERPL-MCNC: 1.24 MG/DL (ref 0.76–1.27)
DEPRECATED RDW RBC AUTO: 43.2 FL (ref 37–54)
EOSINOPHIL # BLD MANUAL: 0.3 10*3/MM3 (ref 0–0.4)
EOSINOPHIL NFR BLD MANUAL: 7 % (ref 0.3–6.2)
ERYTHROCYTE [DISTWIDTH] IN BLOOD BY AUTOMATED COUNT: 12.8 % (ref 12.3–15.4)
GFR SERPL CREATININE-BSD FRML MDRD: 74 ML/MIN/1.73
GLOBULIN UR ELPH-MCNC: 3.1 GM/DL
GLUCOSE SERPL-MCNC: 98 MG/DL (ref 65–99)
HBA1C MFR BLD: 5.7 % (ref 4.8–5.6)
HCT VFR BLD AUTO: 50.5 % (ref 37.5–51)
HDLC SERPL-MCNC: 56 MG/DL (ref 40–60)
HGB BLD-MCNC: 16.9 G/DL (ref 13–17.7)
LDLC SERPL CALC-MCNC: 113 MG/DL (ref 0–100)
LDLC/HDLC SERPL: 2 {RATIO}
LYMPHOCYTES # BLD MANUAL: 2.65 10*3/MM3 (ref 0.7–3.1)
LYMPHOCYTES NFR BLD MANUAL: 10 % (ref 5–12)
LYMPHOCYTES NFR BLD MANUAL: 62 % (ref 19.6–45.3)
MCH RBC QN AUTO: 30.7 PG (ref 26.6–33)
MCHC RBC AUTO-ENTMCNC: 33.5 G/DL (ref 31.5–35.7)
MCV RBC AUTO: 91.8 FL (ref 79–97)
MONOCYTES # BLD AUTO: 0.43 10*3/MM3 (ref 0.1–0.9)
NEUTROPHILS # BLD AUTO: 0.85 10*3/MM3 (ref 1.7–7)
NEUTROPHILS NFR BLD MANUAL: 20 % (ref 42.7–76)
PLAT MORPH BLD: NORMAL
PLATELET # BLD AUTO: 147 10*3/MM3 (ref 140–450)
PMV BLD AUTO: 12.7 FL (ref 6–12)
POIKILOCYTOSIS BLD QL SMEAR: ABNORMAL
POTASSIUM SERPL-SCNC: 3.7 MMOL/L (ref 3.5–5.2)
PROT SERPL-MCNC: 7.7 G/DL (ref 6–8.5)
PSA SERPL-MCNC: 1.08 NG/ML (ref 0–4)
RBC # BLD AUTO: 5.5 10*6/MM3 (ref 4.14–5.8)
SODIUM SERPL-SCNC: 138 MMOL/L (ref 136–145)
TRIGL SERPL-MCNC: 59 MG/DL (ref 0–150)
TSH SERPL DL<=0.05 MIU/L-ACNC: 1.68 UIU/ML (ref 0.27–4.2)
VLDLC SERPL-MCNC: 11 MG/DL (ref 5–40)
WBC # BLD AUTO: 4.27 10*3/MM3 (ref 3.4–10.8)
WBC MORPH BLD: NORMAL

## 2020-12-17 PROCEDURE — 99396 PREV VISIT EST AGE 40-64: CPT | Performed by: FAMILY MEDICINE

## 2020-12-17 PROCEDURE — 83036 HEMOGLOBIN GLYCOSYLATED A1C: CPT | Performed by: FAMILY MEDICINE

## 2020-12-17 PROCEDURE — 80053 COMPREHEN METABOLIC PANEL: CPT | Performed by: FAMILY MEDICINE

## 2020-12-17 PROCEDURE — 90472 IMMUNIZATION ADMIN EACH ADD: CPT | Performed by: FAMILY MEDICINE

## 2020-12-17 PROCEDURE — G0103 PSA SCREENING: HCPCS | Performed by: FAMILY MEDICINE

## 2020-12-17 PROCEDURE — 84443 ASSAY THYROID STIM HORMONE: CPT | Performed by: FAMILY MEDICINE

## 2020-12-17 PROCEDURE — 85007 BL SMEAR W/DIFF WBC COUNT: CPT | Performed by: FAMILY MEDICINE

## 2020-12-17 PROCEDURE — 90471 IMMUNIZATION ADMIN: CPT | Performed by: FAMILY MEDICINE

## 2020-12-17 PROCEDURE — 90750 HZV VACC RECOMBINANT IM: CPT | Performed by: FAMILY MEDICINE

## 2020-12-17 PROCEDURE — 85025 COMPLETE CBC W/AUTO DIFF WBC: CPT | Performed by: FAMILY MEDICINE

## 2020-12-17 PROCEDURE — 90686 IIV4 VACC NO PRSV 0.5 ML IM: CPT | Performed by: FAMILY MEDICINE

## 2020-12-17 PROCEDURE — 80061 LIPID PANEL: CPT | Performed by: FAMILY MEDICINE

## 2020-12-17 RX ORDER — MULTIPLE VITAMINS W/ MINERALS TAB 9MG-400MCG
1 TAB ORAL DAILY
COMMUNITY

## 2020-12-17 RX ORDER — ASPIRIN 81 MG/1
81 TABLET ORAL DAILY
COMMUNITY

## 2020-12-17 RX ORDER — SILDENAFIL CITRATE 20 MG/1
20 TABLET ORAL DAILY PRN
Qty: 30 TABLET | Refills: 3 | Status: SHIPPED | OUTPATIENT
Start: 2020-12-17 | End: 2021-12-23 | Stop reason: SDUPTHER

## 2020-12-17 RX ORDER — CHLORTHALIDONE 25 MG/1
25 TABLET ORAL DAILY
Qty: 90 TABLET | Refills: 3 | Status: SHIPPED | OUTPATIENT
Start: 2020-12-17 | End: 2021-12-23 | Stop reason: SDUPTHER

## 2020-12-17 NOTE — PROGRESS NOTES
Trent Lemus is a 50 y.o. male who presents today to complete annual exam.    Chief Complaint   Patient presents with   • Annual Exam        Patient is here to complete annual exam.  Patient is currently being treated for hypertension which is well controlled at this time.  Patient exercises regularly by running since the gym closed.  He eats a varied and healthy diet.  Patient sees dentist and optometrist regularly.  He has not followed consistently with dermatologist.  Patient has no acute complaints today but has noticed a slight decrease in the firmness of his erections over the past several years.  He is interested and discussing possibility of a low-dose of Viagra to improve his erections.  Patient is able to get and maintain an erection as well as ejaculate.  He does feels like his erections are not as firm and do not last as long as they used to.       Review of Systems   Constitutional: Negative for fever and unexpected weight loss.   HENT: Negative for congestion, ear pain and sore throat.    Eyes: Negative for visual disturbance.   Respiratory: Negative for cough, shortness of breath and wheezing.    Cardiovascular: Negative for chest pain and palpitations.   Gastrointestinal: Negative for abdominal pain, blood in stool, constipation, diarrhea, nausea, vomiting and GERD.   Endocrine: Negative for polydipsia and polyuria.   Genitourinary: Positive for erectile dysfunction. Negative for difficulty urinating.   Musculoskeletal: Negative for joint swelling.   Skin: Negative for rash and skin lesions.   Allergic/Immunologic: Negative for environmental allergies.   Neurological: Negative for seizures and syncope.   Hematological: Does not bruise/bleed easily.   Psychiatric/Behavioral: Negative for suicidal ideas.        PHQ-9 Depression Screening  Little interest or pleasure in doing things? 0   Feeling down, depressed, or hopeless? 0   Trouble falling or staying asleep, or sleeping too much?     Feeling tired  or having little energy?     Poor appetite or overeating?     Feeling bad about yourself - or that you are a failure or have let yourself or your family down?     Trouble concentrating on things, such as reading the newspaper or watching television?     Moving or speaking so slowly that other people could have noticed? Or the opposite - being so fidgety or restless that you have been moving around a lot more than usual?     Thoughts that you would be better off dead, or of hurting yourself in some way?     PHQ-9 Total Score 0   If you checked off any problems, how difficult have these problems made it for you to do your work, take care of things at home, or get along with other people?         Past Medical History:   Diagnosis Date   • History of Helicobacter pylori infection    • Seasonal allergies         Past Surgical History:   Procedure Laterality Date   • COLONOSCOPY  2016        Family History   Problem Relation Age of Onset   • No Known Problems Mother    • Stroke Father    • Hypertension Sister    • Hypertension Brother    • No Known Problems Son    • No Known Problems Maternal Grandmother    • No Known Problems Maternal Grandfather    • No Known Problems Paternal Grandmother    • No Known Problems Paternal Grandfather    • No Known Problems Brother    • No Known Problems Brother    • No Known Problems Brother    • No Known Problems Brother    • No Known Problems Brother    • No Known Problems Brother         Social History     Socioeconomic History   • Marital status:      Spouse name: Dori   • Number of children: 1   • Years of education: College   • Highest education level: Bachelor's degree (e.g., BA, AB, BS)   Occupational History   • Occupation: Realty Sales     Employer: SELF-EMPLOYED   Tobacco Use   • Smoking status: Never Smoker   • Smokeless tobacco: Never Used   Substance and Sexual Activity   • Alcohol use: No   • Drug use: No   • Sexual activity: Yes     Partners: Female        Current  "Outpatient Medications on File Prior to Visit   Medication Sig Dispense Refill   • amLODIPine (NORVASC) 5 MG tablet Take 1 tablet by mouth Daily. 90 tablet 3   • aspirin 81 MG EC tablet Take 81 mg by mouth Daily.     • multivitamin with minerals (MULTIVITAMIN ADULT PO) Take 1 tablet by mouth Daily.     • [DISCONTINUED] chlorthalidone (HYGROTON) 25 MG tablet Take 1 tablet by mouth Daily. 90 tablet 3     No current facility-administered medications on file prior to visit.        No Known Allergies     Visit Vitals  /74   Pulse 61   Temp 96.9 °F (36.1 °C) (Temporal)   Resp 14   Ht 177.8 cm (70\")   Wt 76.5 kg (168 lb 9.6 oz)   SpO2 99%   BMI 24.19 kg/m²      Body mass index is 24.19 kg/m².    Physical Exam  Constitutional:       General: He is not in acute distress.     Appearance: He is well-developed. He is not diaphoretic.   HENT:      Head: Atraumatic.   Neck:      Musculoskeletal: Normal range of motion and neck supple.   Cardiovascular:      Rate and Rhythm: Normal rate and regular rhythm.      Heart sounds: Normal heart sounds. No murmur. No friction rub. No gallop.    Pulmonary:      Effort: Pulmonary effort is normal. No respiratory distress.      Breath sounds: Normal breath sounds. No wheezing or rales.   Abdominal:      General: Bowel sounds are normal. There is no distension.      Palpations: Abdomen is soft.      Tenderness: There is no abdominal tenderness.   Skin:     General: Skin is warm and dry.   Neurological:      Mental Status: He is alert and oriented to person, place, and time.   Psychiatric:         Behavior: Behavior normal.          Results for orders placed or performed in visit on 12/02/19   PSA Screen    Specimen: Arm, Right; Blood   Result Value Ref Range    PSA 1.210 0.000 - 4.000 ng/mL   Vitamin B12    Specimen: Arm, Right; Blood   Result Value Ref Range    Vitamin B-12 962 (H) 211 - 946 pg/mL   Vitamin D 1,25 Dihydroxy    Specimen: Arm, Right; Blood   Result Value Ref Range    " 1,25-Dihydroxy, Vitamin D 37.0 19.9 - 79.3 pg/mL   Lipid Panel With / Chol / HDL Ratio    Specimen: Arm, Right; Blood   Result Value Ref Range    Total Cholesterol 188 0 - 200 mg/dL    Triglycerides 52 0 - 150 mg/dL    HDL Cholesterol 64 (H) 40 - 60 mg/dL    LDL Cholesterol  114 (H) 0 - 100 mg/dL    VLDL Cholesterol 10.4 5 - 40 mg/dL    Chol/HDL Ratio 2.94    Hemoglobin A1c    Specimen: Arm, Right; Blood   Result Value Ref Range    Hemoglobin A1C 5.80 (H) 4.80 - 5.60 %   TSH    Specimen: Arm, Right; Blood   Result Value Ref Range    TSH 1.820 0.270 - 4.200 uIU/mL   Comprehensive Metabolic Panel    Specimen: Arm, Right; Blood   Result Value Ref Range    Glucose 84 65 - 99 mg/dL    BUN 17 6 - 20 mg/dL    Creatinine 1.42 (H) 0.76 - 1.27 mg/dL    Sodium 141 136 - 145 mmol/L    Potassium 3.9 3.5 - 5.2 mmol/L    Chloride 96 (L) 98 - 107 mmol/L    CO2 32.6 (H) 22.0 - 29.0 mmol/L    Calcium 10.0 8.6 - 10.5 mg/dL    Total Protein 8.0 6.0 - 8.5 g/dL    Albumin 4.60 3.50 - 5.20 g/dL    ALT (SGPT) 24 1 - 41 U/L    AST (SGOT) 32 1 - 40 U/L    Alkaline Phosphatase 47 39 - 117 U/L    Total Bilirubin 1.6 (H) 0.2 - 1.2 mg/dL    eGFR  African Amer 64 >60 mL/min/1.73    Globulin 3.4 gm/dL    A/G Ratio 1.4 g/dL    BUN/Creatinine Ratio 12.0 7.0 - 25.0    Anion Gap 12.4 5.0 - 15.0 mmol/L   CBC Auto Differential    Specimen: Arm, Right; Blood   Result Value Ref Range    WBC 3.93 3.40 - 10.80 10*3/mm3    RBC 5.47 4.14 - 5.80 10*6/mm3    Hemoglobin 16.3 13.0 - 17.7 g/dL    Hematocrit 49.8 37.5 - 51.0 %    MCV 91.0 79.0 - 97.0 fL    MCH 29.8 26.6 - 33.0 pg    MCHC 32.7 31.5 - 35.7 g/dL    RDW 13.5 12.3 - 15.4 %    RDW-SD 45.7 37.0 - 54.0 fl    MPV 12.8 (H) 6.0 - 12.0 fL    Platelets 165 140 - 450 10*3/mm3   Manual Differential    Specimen: Arm, Right; Blood   Result Value Ref Range    Neutrophil % 37.0 (L) 42.7 - 76.0 %    Lymphocyte % 38.0 19.6 - 45.3 %    Monocyte % 6.0 5.0 - 12.0 %    Eosinophil % 3.0 0.3 - 6.2 %    Atypical Lymphocyte %  16.0 (H) 0.0 - 5.0 %    Neutrophils Absolute 1.45 (L) 1.70 - 7.00 10*3/mm3    Lymphocytes Absolute 1.49 0.70 - 3.10 10*3/mm3    Monocytes Absolute 0.24 0.10 - 0.90 10*3/mm3    Eosinophils Absolute 0.12 0.00 - 0.40 10*3/mm3    RBC Morphology Normal Normal    WBC Morphology Normal Normal    Platelet Morphology Normal Normal        Problems Addressed this Visit        Cardiovascular and Mediastinum    Hypertension    Relevant Medications    chlorthalidone (HYGROTON) 25 MG tablet       Other    Well adult exam - Primary     The patient is here for health maintenance visit.  Currently, the patient consumes a healthy diet and has an adequate exercise regimen.  Screening lab work is ordered.  Immunizations were reviewed today.  Advice and education was given regarding nutrition, aerobic exercise, routine dental evaluations, routine eye exams, reproductive health, cardiovascular risk reduction, sunscreen use, self skin examination (annual dermatology evaluations) and seatbelt use (general overall safety).  Further recommendations will be given if needed after lab evaluation.  Annual wellness evaluation is recommended.           Relevant Orders    CBC & Differential    Comprehensive Metabolic Panel    Hemoglobin A1c    Lipid Panel    PSA Screen    TSH Rfx On Abnormal To Free T4    FluLaval Quad >6 Months (2492-9970)    Screening for prostate cancer    Relevant Orders    PSA Screen    Combined arterial insufficiency and corporo-venous occlusive erectile dysfunction    Relevant Medications    sildenafil (REVATIO) 20 MG tablet      Other Visit Diagnoses     Need for shingles vaccine        Relevant Orders    Shingrix Vaccine      Diagnoses       Codes Comments    Well adult exam    -  Primary ICD-10-CM: Z00.00  ICD-9-CM: V70.0     Screening for prostate cancer     ICD-10-CM: Z12.5  ICD-9-CM: V76.44     Combined arterial insufficiency and corporo-venous occlusive erectile dysfunction     ICD-10-CM: N52.03  ICD-9-CM: 607.84      Hypertension     ICD-10-CM: I10  ICD-9-CM: 401.9     Need for shingles vaccine     ICD-10-CM: Z23  ICD-9-CM: V04.89           Return in about 1 year (around 12/17/2021) for Annual.    Parts of this office note have been dictated by voice recognition software. Grammatical and/or spelling errors may be present.     Marino Ochoa MD  12/17/2020

## 2021-12-23 ENCOUNTER — OFFICE VISIT (OUTPATIENT)
Dept: FAMILY MEDICINE CLINIC | Facility: CLINIC | Age: 51
End: 2021-12-23

## 2021-12-23 VITALS
SYSTOLIC BLOOD PRESSURE: 115 MMHG | DIASTOLIC BLOOD PRESSURE: 80 MMHG | HEART RATE: 64 BPM | BODY MASS INDEX: 23.62 KG/M2 | WEIGHT: 165 LBS | OXYGEN SATURATION: 98 % | HEIGHT: 70 IN | TEMPERATURE: 98.6 F

## 2021-12-23 DIAGNOSIS — Z00.00 WELL ADULT EXAM: Primary | ICD-10-CM

## 2021-12-23 DIAGNOSIS — Z12.5 SCREENING FOR PROSTATE CANCER: ICD-10-CM

## 2021-12-23 DIAGNOSIS — I10 ESSENTIAL HYPERTENSION: ICD-10-CM

## 2021-12-23 DIAGNOSIS — N52.03 COMBINED ARTERIAL INSUFFICIENCY AND CORPORO-VENOUS OCCLUSIVE ERECTILE DYSFUNCTION: ICD-10-CM

## 2021-12-23 DIAGNOSIS — R73.03 PREDIABETES: ICD-10-CM

## 2021-12-23 LAB
BASOPHILS # BLD AUTO: 0.04 10*3/MM3 (ref 0–0.2)
BASOPHILS NFR BLD AUTO: 0.8 % (ref 0–1.5)
DEPRECATED RDW RBC AUTO: 42.6 FL (ref 37–54)
EOSINOPHIL # BLD AUTO: 0.08 10*3/MM3 (ref 0–0.4)
EOSINOPHIL NFR BLD AUTO: 1.6 % (ref 0.3–6.2)
ERYTHROCYTE [DISTWIDTH] IN BLOOD BY AUTOMATED COUNT: 13 % (ref 12.3–15.4)
HBA1C MFR BLD: 5.9 % (ref 4.8–5.6)
HCT VFR BLD AUTO: 49.2 % (ref 37.5–51)
HGB BLD-MCNC: 17 G/DL (ref 13–17.7)
LYMPHOCYTES # BLD AUTO: 2.46 10*3/MM3 (ref 0.7–3.1)
LYMPHOCYTES NFR BLD AUTO: 50.5 % (ref 19.6–45.3)
MCH RBC QN AUTO: 31.2 PG (ref 26.6–33)
MCHC RBC AUTO-ENTMCNC: 34.6 G/DL (ref 31.5–35.7)
MCV RBC AUTO: 90.3 FL (ref 79–97)
MONOCYTES # BLD AUTO: 0.37 10*3/MM3 (ref 0.1–0.9)
MONOCYTES NFR BLD AUTO: 7.6 % (ref 5–12)
NEUTROPHILS NFR BLD AUTO: 1.91 10*3/MM3 (ref 1.7–7)
NEUTROPHILS NFR BLD AUTO: 39.3 % (ref 42.7–76)
PLATELET # BLD AUTO: 158 10*3/MM3 (ref 140–450)
PMV BLD AUTO: 13.4 FL (ref 6–12)
RBC # BLD AUTO: 5.45 10*6/MM3 (ref 4.14–5.8)
WBC NRBC COR # BLD: 4.87 10*3/MM3 (ref 3.4–10.8)

## 2021-12-23 PROCEDURE — 90686 IIV4 VACC NO PRSV 0.5 ML IM: CPT | Performed by: FAMILY MEDICINE

## 2021-12-23 PROCEDURE — 80061 LIPID PANEL: CPT | Performed by: FAMILY MEDICINE

## 2021-12-23 PROCEDURE — 99396 PREV VISIT EST AGE 40-64: CPT | Performed by: FAMILY MEDICINE

## 2021-12-23 PROCEDURE — 83036 HEMOGLOBIN GLYCOSYLATED A1C: CPT | Performed by: FAMILY MEDICINE

## 2021-12-23 PROCEDURE — G0103 PSA SCREENING: HCPCS | Performed by: FAMILY MEDICINE

## 2021-12-23 PROCEDURE — 85025 COMPLETE CBC W/AUTO DIFF WBC: CPT | Performed by: FAMILY MEDICINE

## 2021-12-23 PROCEDURE — 90471 IMMUNIZATION ADMIN: CPT | Performed by: FAMILY MEDICINE

## 2021-12-23 PROCEDURE — 80053 COMPREHEN METABOLIC PANEL: CPT | Performed by: FAMILY MEDICINE

## 2021-12-23 RX ORDER — SILDENAFIL CITRATE 20 MG/1
20 TABLET ORAL DAILY PRN
Qty: 30 TABLET | Refills: 3 | Status: SHIPPED | OUTPATIENT
Start: 2021-12-23 | End: 2022-12-28 | Stop reason: SDUPTHER

## 2021-12-23 RX ORDER — CHLORTHALIDONE 25 MG/1
25 TABLET ORAL DAILY
Qty: 90 TABLET | Refills: 3 | Status: SHIPPED | OUTPATIENT
Start: 2021-12-23 | End: 2022-12-28 | Stop reason: SDUPTHER

## 2021-12-23 RX ORDER — AMLODIPINE BESYLATE 5 MG/1
5 TABLET ORAL DAILY
Qty: 90 TABLET | Refills: 3 | Status: SHIPPED | OUTPATIENT
Start: 2021-12-23 | End: 2022-12-28

## 2021-12-23 NOTE — PROGRESS NOTES
Trent Lemus is a 51 y.o. male who presents today to complete annual exam.    Chief Complaint   Patient presents with   • Annual Exam        Patient is here for annual exam. He has no acute complaints today. He has been exercising by cycling. He eats a varied and healthy diet but has to avoid dairy due to it causing gas. He sees the dentist and optometrist regularly but not the dermatologist.        Review of Systems   Constitutional: Negative for fever and unexpected weight loss.   HENT: Negative for congestion, ear pain and sore throat.    Eyes: Negative for visual disturbance.   Respiratory: Negative for cough, shortness of breath and wheezing.    Cardiovascular: Negative for chest pain and palpitations.   Gastrointestinal: Negative for abdominal pain, blood in stool, constipation, diarrhea, nausea, vomiting and GERD.   Endocrine: Negative for polydipsia and polyuria.   Genitourinary: Negative for difficulty urinating.   Musculoskeletal: Negative for joint swelling.   Skin: Negative for rash and skin lesions.   Allergic/Immunologic: Negative for environmental allergies.   Neurological: Negative for seizures and syncope.   Hematological: Does not bruise/bleed easily.   Psychiatric/Behavioral: Negative for suicidal ideas.        PHQ-9 Depression Screening  Little interest or pleasure in doing things? 0   Feeling down, depressed, or hopeless? 0   Trouble falling or staying asleep, or sleeping too much?     Feeling tired or having little energy?     Poor appetite or overeating?     Feeling bad about yourself - or that you are a failure or have let yourself or your family down?     Trouble concentrating on things, such as reading the newspaper or watching television?     Moving or speaking so slowly that other people could have noticed? Or the opposite - being so fidgety or restless that you have been moving around a lot more than usual?     Thoughts that you would be better off dead, or of hurting yourself in some  way?     PHQ-9 Total Score 0   If you checked off any problems, how difficult have these problems made it for you to do your work, take care of things at home, or get along with other people?         Past Medical History:   Diagnosis Date   • History of Helicobacter pylori infection    • Seasonal allergies         Past Surgical History:   Procedure Laterality Date   • COLONOSCOPY  2016        Family History   Problem Relation Age of Onset   • No Known Problems Mother    • Stroke Father    • Hypertension Sister    • Hypertension Brother    • No Known Problems Son    • No Known Problems Maternal Grandmother    • No Known Problems Maternal Grandfather    • No Known Problems Paternal Grandmother    • No Known Problems Paternal Grandfather    • No Known Problems Brother    • No Known Problems Brother    • No Known Problems Brother    • No Known Problems Brother    • No Known Problems Brother    • No Known Problems Brother         Social History     Socioeconomic History   • Marital status:      Spouse name: Dori   • Number of children: 1   • Years of education: College   • Highest education level: Bachelor's degree (e.g., BA, AB, BS)   Tobacco Use   • Smoking status: Never Smoker   • Smokeless tobacco: Never Used   Substance and Sexual Activity   • Alcohol use: No   • Drug use: No   • Sexual activity: Yes     Partners: Female        Current Outpatient Medications on File Prior to Visit   Medication Sig Dispense Refill   • aspirin 81 MG EC tablet Take 81 mg by mouth Daily.     • multivitamin with minerals (MULTIVITAMIN ADULT PO) Take 1 tablet by mouth Daily.     • [DISCONTINUED] amLODIPine (NORVASC) 5 MG tablet Take 1 tablet by mouth Daily. 90 tablet 3   • [DISCONTINUED] chlorthalidone (HYGROTON) 25 MG tablet Take 1 tablet by mouth Daily. 90 tablet 3   • [DISCONTINUED] sildenafil (REVATIO) 20 MG tablet Take 1 tablet by mouth Daily As Needed (erectile dysfunction). May increase as needed to obtain erection. Do not  "exceed 5 tablets daily. 30 tablet 3     No current facility-administered medications on file prior to visit.       No Known Allergies     Visit Vitals  /80   Pulse 64   Temp 98.6 °F (37 °C)   Ht 177.8 cm (70\")   Wt 74.8 kg (165 lb)   SpO2 98%   BMI 23.68 kg/m²      Body mass index is 23.68 kg/m².    Physical Exam  Constitutional:       General: He is not in acute distress.     Appearance: He is well-developed. He is not diaphoretic.   HENT:      Head: Atraumatic.   Cardiovascular:      Rate and Rhythm: Normal rate and regular rhythm.      Heart sounds: Normal heart sounds. No murmur heard.  No friction rub. No gallop.    Pulmonary:      Effort: Pulmonary effort is normal. No respiratory distress.      Breath sounds: Normal breath sounds. No stridor. No wheezing, rhonchi or rales.   Abdominal:      General: Bowel sounds are normal. There is no distension.      Palpations: Abdomen is soft. There is no mass.      Tenderness: There is no abdominal tenderness. There is no guarding or rebound.      Hernia: No hernia is present.   Musculoskeletal:      Cervical back: Normal range of motion and neck supple.   Skin:     General: Skin is warm and dry.   Neurological:      Mental Status: He is alert and oriented to person, place, and time.   Psychiatric:         Behavior: Behavior normal.               Problems Addressed this Visit        Cardiac and Vasculature    Hypertension    Relevant Medications    amLODIPine (NORVASC) 5 MG tablet    chlorthalidone (HYGROTON) 25 MG tablet       Genitourinary and Reproductive     Screening for prostate cancer    Relevant Orders    PSA Screen    Combined arterial insufficiency and corporo-venous occlusive erectile dysfunction    Relevant Medications    sildenafil (REVATIO) 20 MG tablet       Health Encounters    Well adult exam - Primary     The patient is here for health maintenance visit.  Currently, the patient consumes a healthy diet and has an adequate exercise regimen.  " Screening lab work is ordered.  Immunizations were reviewed today.  Advice and education was given regarding nutrition, aerobic exercise, routine dental evaluations, routine eye exams, reproductive health, cardiovascular risk reduction, sunscreen use, self skin examination (annual dermatology evaluations) and seatbelt use (general overall safety).  Further recommendations will be given if needed after lab evaluation.  Annual wellness evaluation is recommended.           Relevant Orders    FluLaval/Fluarix/Fluzone >6 Months ([id number])    CBC & Differential    Comprehensive Metabolic Panel    Lipid Panel    PSA Screen    Hemoglobin A1c      Other Visit Diagnoses     Prediabetes        Relevant Orders    Hemoglobin A1c      Diagnoses       Codes Comments    Well adult exam    -  Primary ICD-10-CM: Z00.00  ICD-9-CM: V70.0     Hypertension     ICD-10-CM: I10  ICD-9-CM: 401.9     Combined arterial insufficiency and corporo-venous occlusive erectile dysfunction     ICD-10-CM: N52.03  ICD-9-CM: 607.84     Screening for prostate cancer     ICD-10-CM: Z12.5  ICD-9-CM: V76.44     Prediabetes     ICD-10-CM: R73.03  ICD-9-CM: 790.29           Return in about 1 year (around 12/23/2022) for Annual.    Marino Ochoa MD  12/23/2021

## 2021-12-24 LAB
ALBUMIN SERPL-MCNC: 4.6 G/DL (ref 3.5–5.2)
ALBUMIN/GLOB SERPL: 1.4 G/DL
ALP SERPL-CCNC: 52 U/L (ref 39–117)
ALT SERPL W P-5'-P-CCNC: 22 U/L (ref 1–41)
ANION GAP SERPL CALCULATED.3IONS-SCNC: 12.9 MMOL/L (ref 5–15)
AST SERPL-CCNC: 38 U/L (ref 1–40)
BILIRUB SERPL-MCNC: 1.3 MG/DL (ref 0–1.2)
BUN SERPL-MCNC: 12 MG/DL (ref 6–20)
BUN/CREAT SERPL: 10.1 (ref 7–25)
CALCIUM SPEC-SCNC: 10 MG/DL (ref 8.6–10.5)
CHLORIDE SERPL-SCNC: 96 MMOL/L (ref 98–107)
CHOLEST SERPL-MCNC: 173 MG/DL (ref 0–200)
CO2 SERPL-SCNC: 31.1 MMOL/L (ref 22–29)
CREAT SERPL-MCNC: 1.19 MG/DL (ref 0.76–1.27)
GFR SERPL CREATININE-BSD FRML MDRD: 78 ML/MIN/1.73
GLOBULIN UR ELPH-MCNC: 3.3 GM/DL
GLUCOSE SERPL-MCNC: 88 MG/DL (ref 65–99)
HDLC SERPL-MCNC: 60 MG/DL (ref 40–60)
LDLC SERPL CALC-MCNC: 103 MG/DL (ref 0–100)
LDLC/HDLC SERPL: 1.72 {RATIO}
POTASSIUM SERPL-SCNC: 4.2 MMOL/L (ref 3.5–5.2)
PROT SERPL-MCNC: 7.9 G/DL (ref 6–8.5)
PSA SERPL-MCNC: 1.11 NG/ML (ref 0–4)
SODIUM SERPL-SCNC: 140 MMOL/L (ref 136–145)
TRIGL SERPL-MCNC: 49 MG/DL (ref 0–150)
VLDLC SERPL-MCNC: 10 MG/DL (ref 5–40)

## 2022-12-28 ENCOUNTER — OFFICE VISIT (OUTPATIENT)
Dept: FAMILY MEDICINE CLINIC | Facility: CLINIC | Age: 52
End: 2022-12-28

## 2022-12-28 ENCOUNTER — LAB (OUTPATIENT)
Dept: LAB | Facility: HOSPITAL | Age: 52
End: 2022-12-28
Payer: COMMERCIAL

## 2022-12-28 VITALS
WEIGHT: 167.6 LBS | TEMPERATURE: 98.6 F | OXYGEN SATURATION: 97 % | RESPIRATION RATE: 16 BRPM | SYSTOLIC BLOOD PRESSURE: 106 MMHG | HEIGHT: 70 IN | DIASTOLIC BLOOD PRESSURE: 70 MMHG | BODY MASS INDEX: 23.99 KG/M2 | HEART RATE: 63 BPM

## 2022-12-28 DIAGNOSIS — R73.03 PREDIABETES: ICD-10-CM

## 2022-12-28 DIAGNOSIS — I10 ESSENTIAL HYPERTENSION: ICD-10-CM

## 2022-12-28 DIAGNOSIS — Z12.5 SCREENING FOR MALIGNANT NEOPLASM OF PROSTATE: ICD-10-CM

## 2022-12-28 DIAGNOSIS — Z00.00 ENCOUNTER FOR ROUTINE ADULT HEALTH EXAMINATION WITHOUT ABNORMAL FINDINGS: Primary | ICD-10-CM

## 2022-12-28 DIAGNOSIS — N52.03 COMBINED ARTERIAL INSUFFICIENCY AND CORPORO-VENOUS OCCLUSIVE ERECTILE DYSFUNCTION: ICD-10-CM

## 2022-12-28 LAB
ALBUMIN SERPL-MCNC: 4.2 G/DL (ref 3.5–5.2)
ALBUMIN/GLOB SERPL: 1.6 G/DL
ALP SERPL-CCNC: 46 U/L (ref 39–117)
ALT SERPL W P-5'-P-CCNC: 29 U/L (ref 1–41)
ANION GAP SERPL CALCULATED.3IONS-SCNC: 6 MMOL/L (ref 5–15)
AST SERPL-CCNC: 35 U/L (ref 1–40)
BILIRUB SERPL-MCNC: 0.8 MG/DL (ref 0–1.2)
BUN SERPL-MCNC: 18 MG/DL (ref 6–20)
BUN/CREAT SERPL: 14.1 (ref 7–25)
CALCIUM SPEC-SCNC: 9.2 MG/DL (ref 8.6–10.5)
CHLORIDE SERPL-SCNC: 99 MMOL/L (ref 98–107)
CHOLEST SERPL-MCNC: 191 MG/DL (ref 0–200)
CO2 SERPL-SCNC: 32 MMOL/L (ref 22–29)
CREAT SERPL-MCNC: 1.28 MG/DL (ref 0.76–1.27)
EGFRCR SERPLBLD CKD-EPI 2021: 66.5 ML/MIN/1.73
GLOBULIN UR ELPH-MCNC: 2.7 GM/DL
GLUCOSE SERPL-MCNC: 94 MG/DL (ref 65–99)
HBA1C MFR BLD: 5.9 % (ref 4.8–5.6)
HDLC SERPL-MCNC: 55 MG/DL (ref 40–60)
LDLC SERPL CALC-MCNC: 127 MG/DL (ref 0–100)
LDLC/HDLC SERPL: 2.29 {RATIO}
POTASSIUM SERPL-SCNC: 3.9 MMOL/L (ref 3.5–5.2)
PROT SERPL-MCNC: 6.9 G/DL (ref 6–8.5)
PSA SERPL-MCNC: 1.09 NG/ML (ref 0–4)
SODIUM SERPL-SCNC: 137 MMOL/L (ref 136–145)
TRIGL SERPL-MCNC: 50 MG/DL (ref 0–150)
VLDLC SERPL-MCNC: 9 MG/DL (ref 5–40)

## 2022-12-28 PROCEDURE — G0103 PSA SCREENING: HCPCS | Performed by: STUDENT IN AN ORGANIZED HEALTH CARE EDUCATION/TRAINING PROGRAM

## 2022-12-28 PROCEDURE — 80061 LIPID PANEL: CPT | Performed by: STUDENT IN AN ORGANIZED HEALTH CARE EDUCATION/TRAINING PROGRAM

## 2022-12-28 PROCEDURE — 80053 COMPREHEN METABOLIC PANEL: CPT | Performed by: STUDENT IN AN ORGANIZED HEALTH CARE EDUCATION/TRAINING PROGRAM

## 2022-12-28 PROCEDURE — 99396 PREV VISIT EST AGE 40-64: CPT | Performed by: STUDENT IN AN ORGANIZED HEALTH CARE EDUCATION/TRAINING PROGRAM

## 2022-12-28 PROCEDURE — 83036 HEMOGLOBIN GLYCOSYLATED A1C: CPT | Performed by: STUDENT IN AN ORGANIZED HEALTH CARE EDUCATION/TRAINING PROGRAM

## 2022-12-28 RX ORDER — CHLORTHALIDONE 25 MG/1
25 TABLET ORAL DAILY
Qty: 90 TABLET | Refills: 3 | Status: SHIPPED | OUTPATIENT
Start: 2022-12-28

## 2022-12-28 RX ORDER — BROMPHENIRAMINE MALEATE, PSEUDOEPHEDRINE HYDROCHLORIDE, AND DEXTROMETHORPHAN HYDROBROMIDE 2; 30; 10 MG/5ML; MG/5ML; MG/5ML
SYRUP ORAL
COMMUNITY
Start: 2022-11-26 | End: 2022-12-28

## 2022-12-28 RX ORDER — AMOXICILLIN 875 MG/1
875 TABLET, COATED ORAL EVERY 12 HOURS
COMMUNITY
Start: 2022-11-03 | End: 2022-12-28

## 2022-12-28 RX ORDER — EPINEPHRINE 0.3 MG/.3ML
INJECTION SUBCUTANEOUS
COMMUNITY
Start: 2022-09-21

## 2022-12-28 RX ORDER — SILDENAFIL CITRATE 20 MG/1
20 TABLET ORAL DAILY PRN
Qty: 30 TABLET | Refills: 3 | Status: SHIPPED | OUTPATIENT
Start: 2022-12-28

## 2022-12-28 NOTE — PATIENT INSTRUCTIONS
Blood pressure should be less than 130/80.     Stop amlodipine     Recommend second shingrix at pharmacy.

## 2022-12-28 NOTE — PROGRESS NOTES
Male Physical Note      Patient Name: Trent Lemus  : 1970   MRN: [id number]     Subjective    Subjective     Chief Complaint:    Chief Complaint   Patient presents with   • Annual Exam     Physical, right shoulder and left knee popping       History of Present Illness: Trent Lemus is a 52 y.o. male who is here today for their annual health maintenance and physical.      Blood pressure is at goal at home    Past Medical History, Social History, Family History and Care Team were all reviewed with patient and updated as appropriate.     Medications:     Current Outpatient Medications:   •  aspirin 81 MG EC tablet, Take 81 mg by mouth Daily., Disp: , Rfl:   •  chlorthalidone (HYGROTON) 25 MG tablet, Take 1 tablet by mouth Daily., Disp: 90 tablet, Rfl: 3  •  multivitamin with minerals tablet tablet, Take 1 tablet by mouth Daily., Disp: , Rfl:   •  sildenafil (REVATIO) 20 MG tablet, Take 1 tablet by mouth Daily As Needed (erectile dysfunction). May increase as needed to obtain erection. Do not exceed 5 tablets daily., Disp: 30 tablet, Rfl: 3  •  EPINEPHrine (EPIPEN) 0.3 MG/0.3ML solution auto-injector injection, INJECT CONTENTS OF 1 PEN AS A ONE TIME DOSE AS NEEDED FOR ALLERGIC REACTION, Disp: , Rfl:     Allergies:   No Known Allergies    Immunizations:  Td/Tdap(Booster Q 10 yrs):    Flu (Yearly):    Colorectal Screening:     Last Completed Colonoscopy          COLORECTAL CANCER SCREENING (COLONOSCOPY - Every 10 Years) Next due on 2016  SCANNED - COLONOSCOPY            Up-to-date repeat in     Hep C: Up-to-date  PSA(Over age 50): Ordered      Diet/Physical activity: healthy balanced diet. Exercise with spinning class.     Depression: PHQ-2 Depression Screening  Little interest or pleasure in doing things? 0-->not at all   Feeling down, depressed, or hopeless? 0-->not at all   PHQ-2 Total Score 0       Objective   Objective     Physical Exam:  Vital Signs:   Vitals:    22 1036  "  BP: 106/70   BP Location: Left arm   Patient Position: Sitting   Cuff Size: Adult   Pulse: 63   Resp: 16   Temp: 98.6 °F (37 °C)   TempSrc: Temporal   SpO2: 97%   Weight: 76 kg (167 lb 9.6 oz)   Height: 177.8 cm (70\")   PainSc: 0-No pain     Body mass index is 24.05 kg/m².     Physical Exam  Constitutional:       General: He is not in acute distress.     Appearance: He is not ill-appearing.   Cardiovascular:      Rate and Rhythm: Normal rate and regular rhythm.   Pulmonary:      Effort: Pulmonary effort is normal.      Breath sounds: Normal breath sounds.   Neurological:      Mental Status: He is alert.   Psychiatric:         Thought Content: Thought content normal.       small crepitus with movement to the left knee.  No tenderness or swelling to left knee.  Small crepitus with movement to right shoulder.  Normal range of motion and no pain to palpation right shoulder  Procedures    Assessment / Plan      Assessment/Plan:   Diagnoses and all orders for this visit:    1. Encounter for routine adult health examination without abnormal findings (Primary)  -     Comprehensive Metabolic Panel  -     Hemoglobin A1c  -     Lipid Panel    2. Prediabetes  Assessment & Plan:  Discussed healthy diet and exercise. Hand out given. \        3. Hypertension  Assessment & Plan:  Stop amlodipine and chlorthalidone as BP below goal     Orders:  -     chlorthalidone (HYGROTON) 25 MG tablet; Take 1 tablet by mouth Daily.  Dispense: 90 tablet; Refill: 3    4. Screening for malignant neoplasm of prostate  -     PSA Screen    5. Combined arterial insufficiency and corporo-venous occlusive erectile dysfunction  -     sildenafil (REVATIO) 20 MG tablet; Take 1 tablet by mouth Daily As Needed (erectile dysfunction). May increase as needed to obtain erection. Do not exceed 5 tablets daily.  Dispense: 30 tablet; Refill: 3       Recommend Shingrix #2 at pharmacy as he doesn't want today.     Follow Up:   Return in about 3 months (around " 3/28/2023) for Follow-up.    Healthcare Maintenance:   Health maintenance counseling included discussion of healthy diet and physical activity, Dental hygiene, and vaccinations.  Jonaskristyn Mariah voices understanding and acceptance of this advice and will call back with any further questions or concerns. AVS with preventive healthcare tips printed for patient.     Abdias Garcia MD  Family Medicine - [facility]

## 2023-02-07 ENCOUNTER — TELEPHONE (OUTPATIENT)
Dept: FAMILY MEDICINE CLINIC | Facility: CLINIC | Age: 53
End: 2023-02-07

## 2023-02-07 NOTE — TELEPHONE ENCOUNTER
Caller: Trent Lemus    Relationship to patient: Self    Best call back number:[phone]    REQUESTING TO CHANGE HIS PCP FROM DR ASHBY TO DR CASTILLO

## 2023-03-28 ENCOUNTER — OFFICE VISIT (OUTPATIENT)
Dept: FAMILY MEDICINE CLINIC | Facility: CLINIC | Age: 53
End: 2023-03-28
Payer: COMMERCIAL

## 2023-03-28 ENCOUNTER — LAB (OUTPATIENT)
Dept: LAB | Facility: HOSPITAL | Age: 53
End: 2023-03-28
Payer: COMMERCIAL

## 2023-03-28 VITALS
HEART RATE: 73 BPM | WEIGHT: 167 LBS | TEMPERATURE: 98.6 F | DIASTOLIC BLOOD PRESSURE: 80 MMHG | SYSTOLIC BLOOD PRESSURE: 120 MMHG | HEIGHT: 70 IN | OXYGEN SATURATION: 98 % | BODY MASS INDEX: 23.91 KG/M2

## 2023-03-28 DIAGNOSIS — M25.562 LEFT LATERAL KNEE PAIN: ICD-10-CM

## 2023-03-28 DIAGNOSIS — I10 ESSENTIAL HYPERTENSION: Primary | ICD-10-CM

## 2023-03-28 DIAGNOSIS — I10 ESSENTIAL HYPERTENSION: ICD-10-CM

## 2023-03-28 DIAGNOSIS — Z29.8 NEED FOR MALARIA PROPHYLAXIS: ICD-10-CM

## 2023-03-28 PROBLEM — Z29.89 NEED FOR MALARIA PROPHYLAXIS: Status: ACTIVE | Noted: 2023-03-28

## 2023-03-28 PROBLEM — M25.569 ARTHRALGIA OF KNEE: Status: ACTIVE | Noted: 2023-02-08

## 2023-03-28 PROCEDURE — 99214 OFFICE O/P EST MOD 30 MIN: CPT | Performed by: FAMILY MEDICINE

## 2023-03-28 PROCEDURE — 80053 COMPREHEN METABOLIC PANEL: CPT

## 2023-03-28 RX ORDER — ATOVAQUONE AND PROGUANIL HYDROCHLORIDE 250; 100 MG/1; MG/1
TABLET, FILM COATED ORAL
Qty: 23 TABLET | Refills: 0 | Status: SHIPPED | OUTPATIENT
Start: 2023-03-28

## 2023-03-28 NOTE — ASSESSMENT & PLAN NOTE
Hypertension is controlled.  Continue current treatment regimen.  Blood pressure will be reassessed in 3 months.

## 2023-03-28 NOTE — ASSESSMENT & PLAN NOTE
Patient is traveling to [facility] next week.  He was given malarone for malaria prophylaxis which he will begin 2 days before arriving in the country and discontinue 7 days after leaving the country.

## 2023-03-28 NOTE — PROGRESS NOTES
Trent Lemus is a 52 y.o. male who presents today for Hypertension      At last visit patient's blood pressure was low normal so amlodipine was stopped and he chlorthalidone. He has been checking blood pressure at home and has been seeing numbers in the 110s/70-80s. Occasionally at night he sees numbers in the 130/80 range. He has not seen higher blood pressures or pressures below 100/60. His lowest reading is 106/74. He has been taking medication as prescribed. He denies side effects from medication. He has been exercising regularly cycling and running. He eats a varied and generally healthy diet. Patient plays [employer] and after he played he stretched as he usually does and the next day he had pain in the lateral side of the left knee. He did not have an injury to the knee. He was seen by ortho and they did an x-ray which was normal. They gave him diclofenac gel and an oral medication which improved his symptoms. He continued to have pain with running and cycling. Then the ortho docs did an MRI which showed a tear on the lateral meniscus. They offered him surgery to clean up meniscus. He has been walking instead of running and still cycling. He does not want to do the surgery. He has continued topical diclofenac. Patient is leaving to go to [facility] next week and needs malaria prophylaxis.        Review of Systems   Constitutional: Negative for fever, malaise/fatigue and unexpected weight loss.   HENT: Negative for congestion, ear pain and sore throat.    Eyes: Negative for blurred vision and visual disturbance.   Respiratory: Negative for cough, shortness of breath and wheezing.    Cardiovascular: Negative for chest pain, palpitations, orthopnea and PND.   Gastrointestinal: Negative for abdominal pain, blood in stool, constipation, diarrhea, nausea, vomiting and GERD.   Endocrine: Negative for polydipsia and polyuria.   Genitourinary: Negative for difficulty urinating.   Musculoskeletal: Positive for arthralgias  "(left knee pain, chronic and stable). Negative for joint swelling and neck pain.   Skin: Negative for rash and skin lesions.   Allergic/Immunologic: Negative for environmental allergies.   Neurological: Negative for seizures and syncope.   Hematological: Does not bruise/bleed easily.   Psychiatric/Behavioral: Negative for suicidal ideas.        The following portions of the patient's history were reviewed and updated as appropriate: allergies, current medications, past family history, past medical history, past social history, past surgical history and problem list.    Current Outpatient Medications on File Prior to Visit   Medication Sig Dispense Refill   • aspirin 81 MG EC tablet Take 1 tablet by mouth Daily.     • chlorthalidone (HYGROTON) 25 MG tablet Take 1 tablet by mouth Daily. 90 tablet 3   • EPINEPHrine (EPIPEN) 0.3 MG/0.3ML solution auto-injector injection INJECT CONTENTS OF 1 PEN AS A ONE TIME DOSE AS NEEDED FOR ALLERGIC REACTION     • multivitamin with minerals tablet tablet Take 1 tablet by mouth Daily.     • sildenafil (REVATIO) 20 MG tablet Take 1 tablet by mouth Daily As Needed (erectile dysfunction). May increase as needed to obtain erection. Do not exceed 5 tablets daily. 30 tablet 3   • [DISCONTINUED] Diclofenac Sodium (VOLTAREN) 1 % gel gel APPLY 4 GRAMS TO AFFECTED AREA THREE TIMES DAILY       No current facility-administered medications on file prior to visit.       No Known Allergies     Visit Vitals  /80   Pulse 73   Temp 98.6 °F (37 °C)   Ht 177.8 cm (70\")   Wt 75.8 kg (167 lb)   SpO2 98%   BMI 23.96 kg/m²        Physical Exam  Constitutional:       General: He is not in acute distress.     Appearance: He is well-developed. He is not diaphoretic.   HENT:      Head: Atraumatic.   Cardiovascular:      Rate and Rhythm: Normal rate and regular rhythm.      Heart sounds: Normal heart sounds. No murmur heard.    No friction rub. No gallop.   Pulmonary:      Effort: Pulmonary effort is " normal. No respiratory distress.      Breath sounds: Normal breath sounds. No stridor. No wheezing, rhonchi or rales.   Musculoskeletal:      Cervical back: Normal range of motion and neck supple.   Skin:     General: Skin is warm and dry.   Neurological:      Mental Status: He is alert and oriented to person, place, and time.   Psychiatric:         Behavior: Behavior normal.          Results for orders placed or performed in visit on 12/28/22   Comprehensive Metabolic Panel    Specimen: Blood   Result Value Ref Range    Glucose 94 65 - 99 mg/dL    BUN 18 6 - 20 mg/dL    Creatinine 1.28 (H) 0.76 - 1.27 mg/dL    Sodium 137 136 - 145 mmol/L    Potassium 3.9 3.5 - 5.2 mmol/L    Chloride 99 98 - 107 mmol/L    CO2 32.0 (H) 22.0 - 29.0 mmol/L    Calcium 9.2 8.6 - 10.5 mg/dL    Total Protein 6.9 6.0 - 8.5 g/dL    Albumin 4.2 3.5 - 5.2 g/dL    ALT (SGPT) 29 1 - 41 U/L    AST (SGOT) 35 1 - 40 U/L    Alkaline Phosphatase 46 39 - 117 U/L    Total Bilirubin 0.8 0.0 - 1.2 mg/dL    Globulin 2.7 gm/dL    A/G Ratio 1.6 g/dL    BUN/Creatinine Ratio 14.1 7.0 - 25.0    Anion Gap 6.0 5.0 - 15.0 mmol/L    eGFR 66.5 >60.0 mL/min/1.73   Hemoglobin A1c    Specimen: Blood   Result Value Ref Range    Hemoglobin A1C 5.90 (H) 4.80 - 5.60 %   Lipid Panel    Specimen: Blood   Result Value Ref Range    Total Cholesterol 191 0 - 200 mg/dL    Triglycerides 50 0 - 150 mg/dL    HDL Cholesterol 55 40 - 60 mg/dL    LDL Cholesterol  127 (H) 0 - 100 mg/dL    VLDL Cholesterol 9 5 - 40 mg/dL    LDL/HDL Ratio 2.29    PSA Screen    Specimen: Blood   Result Value Ref Range    PSA 1.090 0.000 - 4.000 ng/mL        Problems Addressed this Visit        Cardiac and Vasculature    Hypertension - Primary     Hypertension is controlled.  Continue current treatment regimen.  Blood pressure will be reassessed in 3 months.         Relevant Orders    Comprehensive Metabolic Panel       Musculoskeletal and Injuries    Left lateral knee pain     Chronic and stable.   Patient will continue diclofenac topically.  He will return to clinic or follow-up with Ortho for worsening pain.         Relevant Medications    Diclofenac Sodium (VOLTAREN) 1 % gel gel       Other    Need for malaria prophylaxis     Patient is traveling to [facility] next week.  He was given malarone for malaria prophylaxis which he will begin 2 days before arriving in the country and discontinue 7 days after leaving the country.         Relevant Medications    atovaquone-proguanil (MALARONE) 250-100 MG tablet per tablet   Diagnoses       Codes Comments    Hypertension    -  Primary ICD-10-CM: I10  ICD-9-CM: 401.9     Left lateral knee pain     ICD-10-CM: M25.562  ICD-9-CM: 719.46     Need for malaria prophylaxis     ICD-10-CM: Z29.8  ICD-9-CM: V07.8           Return in about 3 months (around 6/28/2023) for Follow-up HTN, knee pain.    Marino Ochoa MD   3/28/2023

## 2023-03-28 NOTE — ASSESSMENT & PLAN NOTE
Chronic and stable.  Patient will continue diclofenac topically.  He will return to clinic or follow-up with Ortho for worsening pain.

## 2023-03-29 LAB
ALBUMIN SERPL-MCNC: 4.6 G/DL (ref 3.5–5.2)
ALBUMIN/GLOB SERPL: 1.6 G/DL
ALP SERPL-CCNC: 51 U/L (ref 39–117)
ALT SERPL W P-5'-P-CCNC: 33 U/L (ref 1–41)
ANION GAP SERPL CALCULATED.3IONS-SCNC: 13.4 MMOL/L (ref 5–15)
AST SERPL-CCNC: 40 U/L (ref 1–40)
BILIRUB SERPL-MCNC: 1.1 MG/DL (ref 0–1.2)
BUN SERPL-MCNC: 19 MG/DL (ref 6–20)
BUN/CREAT SERPL: 13.5 (ref 7–25)
CALCIUM SPEC-SCNC: 10.1 MG/DL (ref 8.6–10.5)
CHLORIDE SERPL-SCNC: 95 MMOL/L (ref 98–107)
CO2 SERPL-SCNC: 29.6 MMOL/L (ref 22–29)
CREAT SERPL-MCNC: 1.41 MG/DL (ref 0.76–1.27)
EGFRCR SERPLBLD CKD-EPI 2021: 59.2 ML/MIN/1.73
GLOBULIN UR ELPH-MCNC: 2.9 GM/DL
GLUCOSE SERPL-MCNC: 84 MG/DL (ref 65–99)
POTASSIUM SERPL-SCNC: 4 MMOL/L (ref 3.5–5.2)
PROT SERPL-MCNC: 7.5 G/DL (ref 6–8.5)
SODIUM SERPL-SCNC: 138 MMOL/L (ref 136–145)

## 2023-03-31 ENCOUNTER — TELEPHONE (OUTPATIENT)
Dept: FAMILY MEDICINE CLINIC | Facility: CLINIC | Age: 53
End: 2023-03-31

## 2023-03-31 NOTE — TELEPHONE ENCOUNTER
[facility] NEEDS A CLARIFICATION ON HOW MANY GRAMS HE'S SUPPOSED TO APPLY WITH EACH APPLICATION.    PHONE: [phone]

## 2023-03-31 NOTE — TELEPHONE ENCOUNTER
Caller: Trent Lemus    Relationship: Self    Best call back number: [phone]    What medications are you currently taking:   Current Outpatient Medications on File Prior to Visit   Medication Sig Dispense Refill   • aspirin 81 MG EC tablet Take 1 tablet by mouth Daily.     • atovaquone-proguanil (MALARONE) 250-100 MG tablet per tablet Take on tablet by mouth daily. Start 2 days prior to entering country and discontinue 7 days after leaving the country. 23 tablet 0   • chlorthalidone (HYGROTON) 25 MG tablet Take 1 tablet by mouth Daily. 90 tablet 3   • Diclofenac Sodium (VOLTAREN) 1 % gel gel Apply  topically to the appropriate area as directed 3 (Three) Times a Day. 350 g 1   • EPINEPHrine (EPIPEN) 0.3 MG/0.3ML solution auto-injector injection INJECT CONTENTS OF 1 PEN AS A ONE TIME DOSE AS NEEDED FOR ALLERGIC REACTION     • multivitamin with minerals tablet tablet Take 1 tablet by mouth Daily.     • sildenafil (REVATIO) 20 MG tablet Take 1 tablet by mouth Daily As Needed (erectile dysfunction). May increase as needed to obtain erection. Do not exceed 5 tablets daily. 30 tablet 3     No current facility-administered medications on file prior to visit.          When did you start taking these medications: SINCE 2016    Which medication are you concerned about: CHLORTHALIDONE    Who prescribed you this medication: DOCTOR ISMA     What are your concerns: THE PATIENT STATES THAT HE HAS NOTICED THAT HE IS GETTING VERY DEHYDRATED HE STATES THAT IT HAS BEEN GETTING WORSE THIS YEAR. THE PATIENT STATES THAT HE DRINKS A LOT OF WATER HE DOES NOT DRINK SODA POP. THE PATIENT STATES THAT HIS THROAT STAYS VERY DRY THE PATIENT WOULD LIKE TO KNOW IF THE CHLORTHALIDONE COULD BE CAUSING THE DEHYDRATION PLEASE CALL PATIENT TO DISCUSS

## 2023-06-30 PROBLEM — R79.89 ABNORMAL BLOOD CREATININE LEVEL: Status: ACTIVE | Noted: 2023-06-30

## 2024-01-05 ENCOUNTER — LAB (OUTPATIENT)
Dept: LAB | Facility: HOSPITAL | Age: 54
End: 2024-01-05
Payer: COMMERCIAL

## 2024-01-05 ENCOUNTER — OFFICE VISIT (OUTPATIENT)
Dept: FAMILY MEDICINE CLINIC | Facility: CLINIC | Age: 54
End: 2024-01-05
Payer: COMMERCIAL

## 2024-01-05 VITALS
BODY MASS INDEX: 23.91 KG/M2 | SYSTOLIC BLOOD PRESSURE: 118 MMHG | WEIGHT: 167 LBS | TEMPERATURE: 97.7 F | HEIGHT: 70 IN | DIASTOLIC BLOOD PRESSURE: 76 MMHG

## 2024-01-05 DIAGNOSIS — Z00.00 ENCOUNTER FOR ROUTINE ADULT HEALTH EXAMINATION WITHOUT ABNORMAL FINDINGS: Primary | Chronic | ICD-10-CM

## 2024-01-05 DIAGNOSIS — T78.2XXD ANAPHYLAXIS, SUBSEQUENT ENCOUNTER: ICD-10-CM

## 2024-01-05 DIAGNOSIS — I10 ESSENTIAL HYPERTENSION: ICD-10-CM

## 2024-01-05 DIAGNOSIS — Z00.00 ENCOUNTER FOR ROUTINE ADULT HEALTH EXAMINATION WITHOUT ABNORMAL FINDINGS: ICD-10-CM

## 2024-01-05 DIAGNOSIS — Z12.5 SCREENING FOR MALIGNANT NEOPLASM OF PROSTATE: ICD-10-CM

## 2024-01-05 DIAGNOSIS — R79.89 ABNORMAL BLOOD CREATININE LEVEL: ICD-10-CM

## 2024-01-05 DIAGNOSIS — N52.03 COMBINED ARTERIAL INSUFFICIENCY AND CORPORO-VENOUS OCCLUSIVE ERECTILE DYSFUNCTION: ICD-10-CM

## 2024-01-05 PROBLEM — H43.11 VITREOUS HEMORRHAGE OF RIGHT EYE: Status: ACTIVE | Noted: 2024-01-05

## 2024-01-05 LAB
ALBUMIN SERPL-MCNC: 4.5 G/DL (ref 3.5–5.2)
ALBUMIN/GLOB SERPL: 1.5 G/DL
ALP SERPL-CCNC: 49 U/L (ref 39–117)
ALT SERPL W P-5'-P-CCNC: 27 U/L (ref 1–41)
ANION GAP SERPL CALCULATED.3IONS-SCNC: 11.7 MMOL/L (ref 5–15)
AST SERPL-CCNC: 39 U/L (ref 1–40)
BILIRUB SERPL-MCNC: 1.4 MG/DL (ref 0–1.2)
BILIRUB UR QL STRIP: NEGATIVE
BUN SERPL-MCNC: 21 MG/DL (ref 6–20)
BUN/CREAT SERPL: 15.8 (ref 7–25)
CALCIUM SPEC-SCNC: 9.9 MG/DL (ref 8.6–10.5)
CHLORIDE SERPL-SCNC: 98 MMOL/L (ref 98–107)
CHOLEST SERPL-MCNC: 217 MG/DL (ref 0–200)
CLARITY UR: CLEAR
CO2 SERPL-SCNC: 29.3 MMOL/L (ref 22–29)
COLOR UR: YELLOW
CREAT SERPL-MCNC: 1.33 MG/DL (ref 0.76–1.27)
CREAT UR-MCNC: 126.9 MG/DL
DEPRECATED RDW RBC AUTO: 45.5 FL (ref 37–54)
EGFRCR SERPLBLD CKD-EPI 2021: 63.1 ML/MIN/1.73
ERYTHROCYTE [DISTWIDTH] IN BLOOD BY AUTOMATED COUNT: 13.7 % (ref 12.3–15.4)
GLOBULIN UR ELPH-MCNC: 3.1 GM/DL
GLUCOSE SERPL-MCNC: 94 MG/DL (ref 65–99)
GLUCOSE UR STRIP-MCNC: NEGATIVE MG/DL
HBA1C MFR BLD: 6.1 % (ref 4.8–5.6)
HCT VFR BLD AUTO: 50.9 % (ref 37.5–51)
HDLC SERPL-MCNC: 61 MG/DL (ref 40–60)
HGB BLD-MCNC: 17.1 G/DL (ref 13–17.7)
HGB UR QL STRIP.AUTO: NEGATIVE
KETONES UR QL STRIP: NEGATIVE
LDLC SERPL CALC-MCNC: 150 MG/DL (ref 0–100)
LDLC/HDLC SERPL: 2.44 {RATIO}
LEUKOCYTE ESTERASE UR QL STRIP.AUTO: NEGATIVE
MCH RBC QN AUTO: 30.6 PG (ref 26.6–33)
MCHC RBC AUTO-ENTMCNC: 33.6 G/DL (ref 31.5–35.7)
MCV RBC AUTO: 91.2 FL (ref 79–97)
NITRITE UR QL STRIP: NEGATIVE
PH UR STRIP.AUTO: 7.5 [PH] (ref 5–8)
PLATELET # BLD AUTO: 173 10*3/MM3 (ref 140–450)
PMV BLD AUTO: 12.5 FL (ref 6–12)
POTASSIUM SERPL-SCNC: 4.4 MMOL/L (ref 3.5–5.2)
PROT ?TM UR-MCNC: 9 MG/DL
PROT SERPL-MCNC: 7.6 G/DL (ref 6–8.5)
PROT UR QL STRIP: NEGATIVE
PROT/CREAT UR: 70.9 MG/G CREA (ref 0–200)
PSA SERPL-MCNC: 1.19 NG/ML (ref 0–4)
RBC # BLD AUTO: 5.58 10*6/MM3 (ref 4.14–5.8)
SODIUM SERPL-SCNC: 139 MMOL/L (ref 136–145)
SP GR UR STRIP: 1.02 (ref 1–1.03)
TRIGL SERPL-MCNC: 36 MG/DL (ref 0–150)
UROBILINOGEN UR QL STRIP: NORMAL
VLDLC SERPL-MCNC: 6 MG/DL (ref 5–40)
WBC NRBC COR # BLD AUTO: 4.4 10*3/MM3 (ref 3.4–10.8)

## 2024-01-05 PROCEDURE — 82570 ASSAY OF URINE CREATININE: CPT

## 2024-01-05 PROCEDURE — 81003 URINALYSIS AUTO W/O SCOPE: CPT

## 2024-01-05 PROCEDURE — 80053 COMPREHEN METABOLIC PANEL: CPT

## 2024-01-05 PROCEDURE — G0103 PSA SCREENING: HCPCS

## 2024-01-05 PROCEDURE — 84156 ASSAY OF PROTEIN URINE: CPT

## 2024-01-05 PROCEDURE — 85027 COMPLETE CBC AUTOMATED: CPT

## 2024-01-05 PROCEDURE — 83036 HEMOGLOBIN GLYCOSYLATED A1C: CPT

## 2024-01-05 PROCEDURE — 80061 LIPID PANEL: CPT

## 2024-01-05 RX ORDER — SILDENAFIL CITRATE 20 MG/1
20 TABLET ORAL DAILY PRN
Qty: 30 TABLET | Refills: 3 | Status: SHIPPED | OUTPATIENT
Start: 2024-01-05

## 2024-01-05 RX ORDER — NEOMYCIN SULFATE, POLYMYXIN B SULFATE, AND DEXAMETHASONE 3.5; 10000; 1 MG/G; [USP'U]/G; MG/G
OINTMENT OPHTHALMIC
COMMUNITY
Start: 2023-11-10 | End: 2024-01-05

## 2024-01-05 RX ORDER — EPINEPHRINE 0.3 MG/.3ML
0.3 INJECTION SUBCUTANEOUS ONCE
Qty: 2 EACH | Refills: 0 | Status: SHIPPED | OUTPATIENT
Start: 2024-01-05 | End: 2024-01-05

## 2024-01-05 RX ORDER — MEFLOQUINE HYDROCHLORIDE 250 MG/1
TABLET ORAL
COMMUNITY
Start: 2023-12-28

## 2024-01-05 RX ORDER — HYDROCHLOROTHIAZIDE 12.5 MG/1
12.5 TABLET ORAL DAILY
Qty: 90 TABLET | Refills: 3 | Status: SHIPPED | OUTPATIENT
Start: 2024-01-05

## 2024-01-05 NOTE — PROGRESS NOTES
Male Physical Note      Patient Name: Trent Lemus  : 1970   MRN: [id number]     Subjective    Subjective     Chief Complaint:    Chief Complaint   Patient presents with    Annual Exam     Physical today, patient getting ready to travel to [facility]       History of Present Illness: Trent Lemus is a 53 y.o. male who is here today for their annual health maintenance and physical.      Past Medical History, Social History, Family History and Care Team were all reviewed with patient and updated as appropriate.     Medications:     Current Outpatient Medications:     aspirin 81 MG EC tablet, Take 1 tablet by mouth Daily., Disp: , Rfl:     Diclofenac Sodium (VOLTAREN) 1 % gel gel, Apply  topically to the appropriate area as directed 3 (Three) Times a Day., Disp: 350 g, Rfl: 1    EPINEPHrine (EPIPEN) 0.3 MG/0.3ML solution auto-injector injection, Inject 0.3 mL into the appropriate muscle as directed by prescriber 1 (One) Time for 1 dose., Disp: 2 each, Rfl: 0    mefloquine (LARIAM) 250 MG tablet, TAKE 1 TABLET BY MOUTH ONE WEEK PRIOR TO TRAVEL, THEN CONTINUE WEEKLY UNTIL COMPLETED, Disp: , Rfl:     sildenafil (REVATIO) 20 MG tablet, Take 1 tablet by mouth Daily As Needed (erectile dysfunction)., Disp: 30 tablet, Rfl: 3    hydroCHLOROthiazide (HYDRODIURIL) 12.5 MG tablet, Take 1 tablet by mouth Daily., Disp: 90 tablet, Rfl: 3    Allergies:   No Known Allergies    Immunizations:  Td/Tdap(Booster Q 10 yrs):    Flu (Yearly):    Colorectal Screening:     Last Completed Colonoscopy            COLORECTAL CANCER SCREENING (COLONOSCOPY - Every 10 Years) Next due on 2016  SCANNED - COLONOSCOPY                   CT for Smoker (Age 55-75, 30pk yr):  no  Hep C: no  PSA(Over age 50):  ordered     Diet/Physical activity: healthy diet + exercise     Depression: PHQ-2 Depression Screening  Little interest or pleasure in doing things? 0-->not at all   Feeling down, depressed, or hopeless? 0-->not at all  "  PHQ-2 Total Score 0       Objective   Objective     Physical Exam:  Vital Signs:   Vitals:    01/05/24 0812   BP: 118/76   BP Location: Left arm   Patient Position: Sitting   Cuff Size: Adult   Temp: 97.7 °F (36.5 °C)   TempSrc: Temporal   Weight: 75.8 kg (167 lb)   Height: 177.8 cm (70\")   PainSc: 0-No pain     Body mass index is 23.96 kg/m².     Physical Exam  Constitutional:       General: He is not in acute distress.     Appearance: He is not ill-appearing.   Cardiovascular:      Rate and Rhythm: Normal rate and regular rhythm.   Pulmonary:      Effort: Pulmonary effort is normal.      Breath sounds: Normal breath sounds.   Neurological:      Mental Status: He is alert.   Psychiatric:         Thought Content: Thought content normal.           Procedures    Assessment / Plan      Assessment/Plan:   Diagnoses and all orders for this visit:    1. Encounter for routine adult health examination without abnormal findings (Primary)  -     Comprehensive Metabolic Panel; Future  -     Lipid Panel; Future  -     Hemoglobin A1c; Future  -     CBC (No Diff); Future  -     PSA Screen; Future  -     Protein / Creatinine Ratio, Urine - Urine, Clean Catch; Future  -     Urinalysis With Microscopic If Indicated (No Culture) - Urine, Clean Catch; Future  -     Tdap Vaccine Greater Than or Equal To 8yo IM    2. Screening for malignant neoplasm of prostate  -     PSA Screen; Future    3. Abnormal blood creatinine level  -     Protein / Creatinine Ratio, Urine - Urine, Clean Catch; Future  -     Urinalysis With Microscopic If Indicated (No Culture) - Urine, Clean Catch; Future    4. Anaphylaxis, subsequent encounter  -     EPINEPHrine (EPIPEN) 0.3 MG/0.3ML solution auto-injector injection; Inject 0.3 mL into the appropriate muscle as directed by prescriber 1 (One) Time for 1 dose.  Dispense: 2 each; Refill: 0    5. Combined arterial insufficiency and corporo-venous occlusive erectile dysfunction  -     sildenafil (REVATIO) 20 MG " tablet; Take 1 tablet by mouth Daily As Needed (erectile dysfunction).  Dispense: 30 tablet; Refill: 3    6. Hypertension  Assessment & Plan:  Blood pressure remains low normal stop chlorthalidone switch to HCTZ 12.5    Orders:  -     hydroCHLOROthiazide (HYDRODIURIL) 12.5 MG tablet; Take 1 tablet by mouth Daily.  Dispense: 90 tablet; Refill: 3    Other orders  -     Fluzone >6 Months ([id number])           Follow Up:   Return in about 3 months (around 4/5/2024) for Follow-up.    Healthcare Maintenance:   Health maintenance counseling included discussion of healthy diet and physical activity, Dental hygiene, and vaccinations.  Trent Lemus voices understanding and acceptance of this advice and will call back with any further questions or concerns. AVS with preventive healthcare tips printed for patient.     Abdias Garcia MD  Family Medicine - [facility] [place]

## 2024-01-05 NOTE — LETTER
[facility]  Vaccine Consent Form    Patient Name:  Trent Lemus  Patient :  1970     Vaccine(s) Ordered    Tdap Vaccine Greater Than or Equal To 8yo IM  Fluzone >6 Months ([id number])        Screening Checklist  The following questions should be completed prior to vaccination. If you answer “yes” to any question, it does not necessarily mean you should not be vaccinated. It just means we may need to clarify or ask more questions. If a question is unclear, please ask your healthcare provider to explain it.    Yes No   Any fever or moderate to severe illness today (mild illness and/or antibiotic treatment are not contraindications)?     Do you have a history of a serious reaction to any previous vaccinations, such as anaphylaxis, encephalopathy within 7 days, Guillain-Derby syndrome within 6 weeks, seizure?     Have you received any live vaccine(s) (e.g MMR, DAVID) or any other vaccines in the last month (to ensure duplicate doses aren't given)?     Do you have an anaphylactic allergy to latex (DTaP, DTaP-IPV, Hep A, Hep B, MenB, RV, Td, Tdap), baker’s yeast (Hep B, HPV), polysorbates (RSV, nirsevimab, PCV 20, Rotavirrus, Tdap, Shingrix), or gelatin (DAVID, MMR)?     Do you have an anaphylactic allergy to neomycin (Rabies, DAVID, MMR, IPV, Hep A), polymyxin B (IPV), or streptomycin (IPV)?      Any cancer, leukemia, AIDS, or other immune system disorder? (DAVID, MMR, RV)     Do you have a parent, brother, or sister with an immune system problem (if immune competence of vaccine recipient clinically verified, can proceed)? (MMR, DAVID)     Any recent steroid treatments for >2 weeks, chemotherapy, or radiation treatment? (DAVID, MMR)     Have you received antibody-containing blood transfusions or IVIG in the past 11 months (recommended interval is dependent on product)? (MMR, DAVID)     Have you taken antiviral drugs (acyclovir, famciclovir, valacyclovir for DAVID) in the last 24 or 48 hours, respectively?      Are you pregnant  "or planning to become pregnant within 1 month? (DAVID, MMR, HPV, IPV, MenB, Abrexvy; For Hep B- refer to Engerix-B; For RSV - Abrysvo is indicated for 32-36 weeks of pregnancy from September to January)     For infants, have you ever been told your child has had intussusception or a medical emergency involving obstruction of the intestine (Rotavirus)? If not for an infant, can skip this question.         *Ordering Physicians/APC should be consulted if \"yes\" is checked by the patient or guardian above.  I have received, read, and understand the Vaccine Information Statement (VIS) for each vaccine ordered.  I have considered my or my child's health status as well as the health status of my close contacts.  I have taken the opportunity to discuss my vaccine questions with my or my child's health care provider.   I have requested that the ordered vaccine(s) be given to me or my child.  I understand the benefits and risks of the vaccines.  I understand that I should remain in the clinic for 15 minutes after receiving the vaccine(s).  _________________________________________________________  Signature of Patient or Parent/Legal Guardian ____________________  Date     "

## 2024-01-08 DIAGNOSIS — E78.5 DYSLIPIDEMIA: Primary | ICD-10-CM

## 2024-04-05 ENCOUNTER — OFFICE VISIT (OUTPATIENT)
Dept: FAMILY MEDICINE CLINIC | Facility: CLINIC | Age: 54
End: 2024-04-05
Payer: COMMERCIAL

## 2024-04-05 VITALS
BODY MASS INDEX: 23.37 KG/M2 | TEMPERATURE: 98.2 F | WEIGHT: 163.2 LBS | HEART RATE: 64 BPM | RESPIRATION RATE: 16 BRPM | OXYGEN SATURATION: 98 % | SYSTOLIC BLOOD PRESSURE: 142 MMHG | HEIGHT: 70 IN | DIASTOLIC BLOOD PRESSURE: 92 MMHG

## 2024-04-05 DIAGNOSIS — R73.03 PREDIABETES: Primary | ICD-10-CM

## 2024-04-05 DIAGNOSIS — I10 ESSENTIAL HYPERTENSION: ICD-10-CM

## 2024-04-05 DIAGNOSIS — E78.5 DYSLIPIDEMIA: ICD-10-CM

## 2024-04-05 DIAGNOSIS — L98.9 SKIN LESION: ICD-10-CM

## 2024-04-05 PROBLEM — Z29.89 NEED FOR MALARIA PROPHYLAXIS: Status: RESOLVED | Noted: 2023-03-28 | Resolved: 2024-04-05

## 2024-04-05 RX ORDER — EPINEPHRINE 0.3 MG/.3ML
INJECTION SUBCUTANEOUS
COMMUNITY
Start: 2024-01-05

## 2024-04-05 NOTE — PROGRESS NOTES
"  Established Patient Office Visit        Subjective      Chief Complaint:  Hypertension (3 month follow up on hypertension, having headaches, had been taking liquorice root tea without knowing it causes bp to rise. 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 Patient has a vitreous hemorrhage in Nov 2023 right eye.)      History of Present Illness: Trent Lemus is a 53 y.o. male who presents for HTN was taking licorice tea and blood pressure went up.     + mild headache     Skin lesion to forehead     Patient Active Problem List   Diagnosis    Hypertension    Well adult exam    History of Helicobacter pylori infection    Seasonal allergies    Screening for prostate cancer    Combined arterial insufficiency and corporo-venous occlusive erectile dysfunction    Prediabetes    Arthralgia of knee    Left lateral knee pain    Abnormal blood creatinine level    Vitreous hemorrhage of right eye         Current Outpatient Medications:     aspirin 81 MG EC tablet, Take 1 tablet by mouth Daily., Disp: , Rfl:     EPINEPHrine (EPIPEN) 0.3 MG/0.3ML solution auto-injector injection, INJECT CONTENTS OF 1 PEN INTO THE MUSCLE AS NEEDED FOR ALLERGIC REACTION, Disp: , Rfl:     hydroCHLOROthiazide (HYDRODIURIL) 12.5 MG tablet, Take 1 tablet by mouth Daily., Disp: 90 tablet, Rfl: 3    sildenafil (REVATIO) 20 MG tablet, Take 1 tablet by mouth Daily As Needed (erectile dysfunction)., Disp: 30 tablet, Rfl: 3    Diclofenac Sodium (VOLTAREN) 1 % gel gel, Apply  topically to the appropriate area as directed 3 (Three) Times a Day. (Patient not taking: Reported on 4/5/2024), Disp: 350 g, Rfl: 1       Objective     Physical Exam:   Vital Signs:   /92 (BP Location: Right arm, Patient Position: Sitting, Cuff Size: Adult)   Pulse 64   Temp 98.2 °F (36.8 °C) (Temporal)   Resp 16   Ht 177.8 cm (70\")   Wt 74 kg (163 lb 3.2 oz)   SpO2 98%   BMI 23.42 kg/m²      Physical Exam  Constitutional:       General: He is not in " acute distress.     Appearance: He is not ill-appearing.   Cardiovascular:      Rate and Rhythm: Normal rate and regular rhythm.   Pulmonary:      Effort: Pulmonary effort is normal.      Breath sounds: Normal breath sounds.   Neurological:      Mental Status: He is alert.   Psychiatric:         Thought Content: Thought content normal.   Keratinized skin projecting of the forehead.  Lesion is about 2 mm  Skin Excision    Date/Time: 4/5/2024 12:41 PM    Performed by: Abdias Garcia MD  Authorized by: Abdias Garcia MD    Consent:     Consent obtained:  Verbal    Consent given by:  Patient    Risks discussed:  Bleeding, infection and poor cosmetic result  Pre-procedure details:     Preparation: Patient was prepped and draped in usual sterile fashion    Anesthesia:     Anesthesia method:  None  Procedure details:     Body Area:  Head/neck    Head/Neck Location:  Forehead    Initial Size:  2    Final defect size (mm):  3  Post-procedure details:     Patient tolerance of procedure:  Tolerated well, no immediate complications  Comments:      Area cleaned with alcohol.  15 blade scalpel used to excise lesion at the junction of the keratinized material and normal skin.  Bleeding stopped with Monsel solution.  No suture required. <1 cc blood loss.  Adhesive is bandage placed        Assessment / Plan      Assessment/Plan:   Diagnoses and all orders for this visit:    1. Prediabetes (Primary)  -     Cancel: Hemoglobin A1c; Future  -     Hemoglobin A1c; Future  -healthy diet     2. Hypertension  -     Cancel: Basic Metabolic Panel; Future  -     Comprehensive Metabolic Panel; Future\  Continue HCTZ he did have a rise in his blood pressure when taking licorice root which he stopped.  Check blood pressure and can increase HCTZ if not at goal at home    3. Dyslipidemia  -     Lipid Panel; Future  -Recheck lipid panel and ASCVD risk for    4. Skin lesion  -     Skin Excision    Possible cutaneous horn with normal borders  benign-appearing. Tolerated excision well. F/u for concerns or enlargement       Follow Up:   Return in about 3 months (around 7/5/2024) for Follow-up.    MDM:     Abdias Garcia MD  Family Medicine - [facility]

## 2024-08-13 ENCOUNTER — LAB (OUTPATIENT)
Dept: LAB | Facility: HOSPITAL | Age: 54
End: 2024-08-13
Payer: COMMERCIAL

## 2024-08-13 ENCOUNTER — OFFICE VISIT (OUTPATIENT)
Dept: FAMILY MEDICINE CLINIC | Facility: CLINIC | Age: 54
End: 2024-08-13
Payer: COMMERCIAL

## 2024-08-13 VITALS
BODY MASS INDEX: 22.42 KG/M2 | HEIGHT: 70 IN | TEMPERATURE: 98.6 F | DIASTOLIC BLOOD PRESSURE: 88 MMHG | RESPIRATION RATE: 17 BRPM | OXYGEN SATURATION: 99 % | SYSTOLIC BLOOD PRESSURE: 120 MMHG | HEART RATE: 67 BPM | WEIGHT: 156.6 LBS

## 2024-08-13 DIAGNOSIS — R79.89 ABNORMAL BLOOD CREATININE LEVEL: ICD-10-CM

## 2024-08-13 DIAGNOSIS — E78.5 DYSLIPIDEMIA: ICD-10-CM

## 2024-08-13 DIAGNOSIS — I10 ESSENTIAL HYPERTENSION: ICD-10-CM

## 2024-08-13 DIAGNOSIS — R73.03 PREDIABETES: Primary | ICD-10-CM

## 2024-08-13 LAB
ALBUMIN SERPL-MCNC: 4.1 G/DL (ref 3.5–5.2)
ALBUMIN/GLOB SERPL: 1.4 G/DL
ALP SERPL-CCNC: 54 U/L (ref 39–117)
ALT SERPL W P-5'-P-CCNC: 26 U/L (ref 1–41)
ANION GAP SERPL CALCULATED.3IONS-SCNC: 12.1 MMOL/L (ref 5–15)
AST SERPL-CCNC: 33 U/L (ref 1–40)
BILIRUB SERPL-MCNC: 1.4 MG/DL (ref 0–1.2)
BUN SERPL-MCNC: 16 MG/DL (ref 6–20)
BUN/CREAT SERPL: 11.5 (ref 7–25)
CALCIUM SPEC-SCNC: 9.7 MG/DL (ref 8.6–10.5)
CHLORIDE SERPL-SCNC: 100 MMOL/L (ref 98–107)
CHOLEST SERPL-MCNC: 175 MG/DL (ref 0–200)
CO2 SERPL-SCNC: 25.9 MMOL/L (ref 22–29)
CREAT SERPL-MCNC: 1.39 MG/DL (ref 0.76–1.27)
EGFRCR SERPLBLD CKD-EPI 2021: 59.5 ML/MIN/1.73
EXPIRATION DATE: NORMAL
GLOBULIN UR ELPH-MCNC: 3 GM/DL
GLUCOSE SERPL-MCNC: 75 MG/DL (ref 65–99)
HBA1C MFR BLD: 5.5 % (ref 4.5–5.7)
HDLC SERPL-MCNC: 59 MG/DL (ref 40–60)
LDLC SERPL CALC-MCNC: 109 MG/DL (ref 0–100)
LDLC/HDLC SERPL: 1.85 {RATIO}
Lab: NORMAL
POTASSIUM SERPL-SCNC: 4 MMOL/L (ref 3.5–5.2)
PROT SERPL-MCNC: 7.1 G/DL (ref 6–8.5)
SODIUM SERPL-SCNC: 138 MMOL/L (ref 136–145)
TRIGL SERPL-MCNC: 34 MG/DL (ref 0–150)
VLDLC SERPL-MCNC: 7 MG/DL (ref 5–40)

## 2024-08-13 PROCEDURE — 80061 LIPID PANEL: CPT

## 2024-08-13 PROCEDURE — 83036 HEMOGLOBIN GLYCOSYLATED A1C: CPT | Performed by: STUDENT IN AN ORGANIZED HEALTH CARE EDUCATION/TRAINING PROGRAM

## 2024-08-13 PROCEDURE — 80053 COMPREHEN METABOLIC PANEL: CPT

## 2024-08-13 PROCEDURE — 99214 OFFICE O/P EST MOD 30 MIN: CPT | Performed by: STUDENT IN AN ORGANIZED HEALTH CARE EDUCATION/TRAINING PROGRAM

## 2024-08-13 RX ORDER — HYDROCHLOROTHIAZIDE 12.5 MG/1
1 TABLET ORAL DAILY
COMMUNITY
Start: 2024-06-26 | End: 2024-08-13

## 2024-08-13 RX ORDER — HYDROCHLOROTHIAZIDE 25 MG/1
25 TABLET ORAL DAILY
Qty: 90 TABLET | Refills: 1 | Status: SHIPPED | OUTPATIENT
Start: 2024-08-13

## 2024-08-13 RX ORDER — CHLORHEXIDINE GLUCONATE ORAL RINSE 1.2 MG/ML
SOLUTION DENTAL
COMMUNITY
Start: 2024-06-27

## 2024-08-13 NOTE — ASSESSMENT & PLAN NOTE
He has some low normal blood pressure on full dose chlorthalidone and mild orthostasis.  Blood pressure above goal on half dose chlorthalidone.   Switch to HCTZ 25 mg follow-up in 6 months

## 2024-12-17 ENCOUNTER — TELEPHONE (OUTPATIENT)
Dept: FAMILY MEDICINE CLINIC | Facility: CLINIC | Age: 54
End: 2024-12-17
Payer: COMMERCIAL

## 2024-12-17 DIAGNOSIS — B00.1 HERPES LABIALIS: Primary | ICD-10-CM

## 2024-12-17 RX ORDER — VALACYCLOVIR HYDROCHLORIDE 1 G/1
TABLET, FILM COATED ORAL
Qty: 20 TABLET | Refills: 1 | Status: SHIPPED | OUTPATIENT
Start: 2024-12-17

## 2024-12-17 NOTE — TELEPHONE ENCOUNTER
Caller: Mariah Jonaskristyn    Relationship: Self    Best call back number: [phone]     What medication are you requesting: ACYCLOVIR OR VALTREX    What are your current symptoms: FEVER BLISTER BREAK OUT    How long have you been experiencing symptoms: 12.17.24    Have you had these symptoms before:    [x] Yes  [] No    Have you been treated for these symptoms before:   [x] Yes  [] No    If a prescription is needed, what is your preferred pharmacy and phone number: [address] [phone] [facility] [phone]      Additional notes: PLEASE CALL PATIENT WHEN MEDICATION HAS BEEN APPROVED OR DENIED.

## 2025-02-12 DIAGNOSIS — I10 ESSENTIAL HYPERTENSION: ICD-10-CM

## 2025-02-12 RX ORDER — HYDROCHLOROTHIAZIDE 25 MG/1
25 TABLET ORAL DAILY
Qty: 90 TABLET | Refills: 0 | Status: SHIPPED | OUTPATIENT
Start: 2025-02-12

## 2025-02-23 ENCOUNTER — APPOINTMENT (OUTPATIENT)
Dept: GENERAL RADIOLOGY | Facility: HOSPITAL | Age: 55
End: 2025-02-23
Payer: COMMERCIAL

## 2025-02-23 ENCOUNTER — HOSPITAL ENCOUNTER (EMERGENCY)
Facility: HOSPITAL | Age: 55
Discharge: HOME | End: 2025-02-23
Attending: EMERGENCY MEDICINE | Admitting: EMERGENCY MEDICINE
Payer: COMMERCIAL

## 2025-02-23 VITALS
DIASTOLIC BLOOD PRESSURE: 100 MMHG | SYSTOLIC BLOOD PRESSURE: 172 MMHG | HEART RATE: 80 BPM | BODY MASS INDEX: 22.41 KG/M2 | OXYGEN SATURATION: 98 % | HEIGHT: 70 IN | TEMPERATURE: 98.7 F | RESPIRATION RATE: 14 BRPM | WEIGHT: 156.53 LBS

## 2025-02-23 DIAGNOSIS — S93.402A MODERATE LEFT ANKLE SPRAIN, INITIAL ENCOUNTER: Primary | ICD-10-CM

## 2025-02-23 PROCEDURE — 73610 X-RAY EXAM OF ANKLE: CPT

## 2025-02-23 PROCEDURE — 73630 X-RAY EXAM OF FOOT: CPT

## 2025-02-23 PROCEDURE — 99283 EMERGENCY DEPT VISIT LOW MDM: CPT

## 2025-02-23 RX ORDER — NAPROXEN 250 MG/1
500 TABLET ORAL ONCE
Status: COMPLETED | OUTPATIENT
Start: 2025-02-23 | End: 2025-02-23

## 2025-02-23 RX ADMIN — NAPROXEN 500 MG: 250 TABLET ORAL at 23:34

## 2025-02-24 NOTE — ED PROVIDER NOTES
Subjective   History of Present Illness  54-year-old male presents for evaluation of left foot and ankle pain.  The patient is quite active and healthy for his age.  He is an avid cycler.  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  As a result, he came here to the ED to be evaluated.  He is complaining of isolated pain to his left foot and ankle.  He denies any paresthesias.  No rash.  He has no other complaints at this time.  He currently describes the pain as sharp and rates it at 6 out of 10 in severity.      Review of Systems   Constitutional:  Negative for fever.   Respiratory:  Negative for cough.    Musculoskeletal:         Left foot and ankle pain   Neurological:  Negative for numbness.       Past Medical History:   Diagnosis Date    History of Helicobacter pylori infection     Hypertension 7/12/2016    Seasonal allergies        No Known Allergies    Past Surgical History:   Procedure Laterality Date    COLONOSCOPY  2016    EYE SURGERY  11/2023    Vitreous Hemorrhage       Family History   Problem Relation Age of Onset    No Known Problems Mother     Stroke Father     Hypertension Father         dicease    Hypertension Sister         High blood pressure    Hypertension Brother         High blood pressure    No Known Problems Son     No Known Problems Maternal Grandmother     No Known Problems Maternal Grandfather     No Known Problems Paternal Grandmother     No Known Problems Paternal Grandfather     No Known Problems Brother     No Known Problems Brother     No Known Problems Brother     No Known Problems Brother     No Known Problems Brother     No Known Problems Brother        Social History     Socioeconomic History    Marital status:      Spouse name: Dori    Number of children: 1    Years of education: College    Highest education level: Bachelor's degree  (e.g., BA, AB, BS)   Tobacco Use    Smoking status: Never    Smokeless tobacco: Never   Vaping Use    Vaping status: Never Used   Substance and Sexual Activity    Alcohol use: No    Drug use: No    Sexual activity: Yes     Partners: Female           Objective   Physical Exam  Vitals and nursing note reviewed.   Constitutional:       General: He is not in acute distress.     Appearance: He is well-developed. He is not diaphoretic.      Comments: Well-appearing male in no acute distress   HENT:      Head: Normocephalic and atraumatic.   Neck:      Vascular: No JVD.   Cardiovascular:      Rate and Rhythm: Normal rate.   Pulmonary:      Effort: Pulmonary effort is normal.   Musculoskeletal:         General: Normal range of motion.      Comments: Minimal soft tissue swelling noted to lateral aspect of left ankle, negative Tsai's test   Skin:     General: Skin is warm and dry.      Coloration: Skin is not pale.      Findings: No erythema or rash.      Comments: No warmth or erythema noted to left foot or ankle region   Neurological:      Mental Status: He is alert and oriented to person, place, and time.      Comments: Left lower extremity is neurovascularly intact distally with bounding distal pulses and normal sensation noted   Psychiatric:         Thought Content: Thought content normal.         Judgment: Judgment normal.         Procedures           ED Course  ED Course as of 02/23/25 2326   Sun Feb 23, 2025   2308 54-year-old male presents for evaluation of left foot and ankle pain.  The patient is quite active for his age.  He is an avid cycler.  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  As a result, he came here to the ED to be evaluated.  He is complaining of isolated pain to his left foot and ankle.  On arrival, the patient is nontoxic-appearing.  Benign exam.  Left  lower extremity is neurovascularly intact distally with bounding distal pulses and normal sensation noted.  No exam or historical findings to suggest infectious etiology or septic joint.  Negative Tsai's test.  Pain control provided.  We will obtain plain films, and we will reassess following initial interventions. [DD]   2323 I personally and independently viewed the patient's x-ray images myself, and I am in agreement with the radiologist's reading for final interpretation, particularly there is no underlying fracture noted. [DD]   2323 Patient reassured and counseled regarding symptomatic management of foot/ankle sprain.  He will follow-up with his primary care physician within the next week.  Agreeable with plan and given appropriate strict return precautions.  Encouraged RICE therapy.  Ace wrap given at discharge per [DD]      ED Course User Index  [DD] John Jung MD                                           No results found for this or any previous visit (from the past 24 hours).  Note: In addition to lab results from this visit, the labs listed above may include labs taken at another facility or during a different encounter within the last 24 hours. Please correlate lab times with ED admission and discharge times for further clarification of the services performed during this visit.    XR Ankle 3+ View Left   Final Result   Impression:   No acute osseous abnormality.               Electronically Signed: Adrian Platt MD     2/23/2025 11:22 PM EST     Workstation ID: [id number]      XR Foot 3+ View Left   Final Result   Impression:   No acute osseous abnormality.               Electronically Signed: Adrian Platt MD     2/23/2025 11:22 PM EST     Workstation ID: [id number]        Vitals:    02/23/25 2249   BP: 172/100   BP Location: Right arm   Patient Position: Sitting   Pulse: 80   Resp: 14   Temp: 98.7 °F (37.1 °C)   TempSrc: Oral   SpO2: 98%   Weight: 71 kg (156 lb 8.4 oz)   Height: 177.8 cm  "(70\")     Medications   naproxen (NAPROSYN) tablet 500 mg (has no administration in time range)     ECG/EMG Results (last 24 hours)       ** No results found for the last 24 hours. **          No orders to display                   Medical Decision Making  Problems Addressed:  Moderate left ankle sprain, initial encounter: complicated acute illness or injury    Amount and/or Complexity of Data Reviewed  Radiology: ordered.    Risk  Prescription drug management.        Final diagnoses:   Moderate left ankle sprain, initial encounter       ED Disposition  ED Disposition       ED Disposition   Discharge    Condition   Stable    Comment   --               Abdias Garcia MD  [address] [place] [address]  [phone]    In 1 week           Medication List      No changes were made to your prescriptions during this visit.            John Jung MD  02/23/25 2334    "

## 2025-03-21 ENCOUNTER — LAB (OUTPATIENT)
Dept: LAB | Facility: HOSPITAL | Age: 55
End: 2025-03-21
Payer: COMMERCIAL

## 2025-03-21 ENCOUNTER — OFFICE VISIT (OUTPATIENT)
Dept: FAMILY MEDICINE CLINIC | Facility: CLINIC | Age: 55
End: 2025-03-21
Payer: COMMERCIAL

## 2025-03-21 VITALS
BODY MASS INDEX: 24.14 KG/M2 | HEART RATE: 64 BPM | DIASTOLIC BLOOD PRESSURE: 88 MMHG | OXYGEN SATURATION: 98 % | RESPIRATION RATE: 20 BRPM | TEMPERATURE: 98.6 F | SYSTOLIC BLOOD PRESSURE: 128 MMHG | HEIGHT: 70 IN | WEIGHT: 168.6 LBS

## 2025-03-21 DIAGNOSIS — Z29.89 NEED FOR MALARIA PROPHYLAXIS: ICD-10-CM

## 2025-03-21 DIAGNOSIS — Z12.5 SCREENING FOR MALIGNANT NEOPLASM OF PROSTATE: ICD-10-CM

## 2025-03-21 DIAGNOSIS — I10 ESSENTIAL HYPERTENSION: ICD-10-CM

## 2025-03-21 DIAGNOSIS — N52.03 COMBINED ARTERIAL INSUFFICIENCY AND CORPORO-VENOUS OCCLUSIVE ERECTILE DYSFUNCTION: ICD-10-CM

## 2025-03-21 DIAGNOSIS — Z00.00 ENCOUNTER FOR ROUTINE ADULT HEALTH EXAMINATION WITHOUT ABNORMAL FINDINGS: Primary | ICD-10-CM

## 2025-03-21 DIAGNOSIS — Z00.00 ENCOUNTER FOR ROUTINE ADULT HEALTH EXAMINATION WITHOUT ABNORMAL FINDINGS: ICD-10-CM

## 2025-03-21 LAB
ALBUMIN SERPL-MCNC: 4 G/DL (ref 3.5–5.2)
ALBUMIN/GLOB SERPL: 1.3 G/DL
ALP SERPL-CCNC: 60 U/L (ref 39–117)
ALT SERPL W P-5'-P-CCNC: 25 U/L (ref 1–41)
ANION GAP SERPL CALCULATED.3IONS-SCNC: 10 MMOL/L (ref 5–15)
AST SERPL-CCNC: 31 U/L (ref 1–40)
BASOPHILS # BLD MANUAL: 0.08 10*3/MM3 (ref 0–0.2)
BASOPHILS NFR BLD MANUAL: 2.1 % (ref 0–1.5)
BILIRUB SERPL-MCNC: 0.8 MG/DL (ref 0–1.2)
BUN SERPL-MCNC: 13 MG/DL (ref 6–20)
BUN/CREAT SERPL: 9.5 (ref 7–25)
CALCIUM SPEC-SCNC: 9.6 MG/DL (ref 8.6–10.5)
CHLORIDE SERPL-SCNC: 100 MMOL/L (ref 98–107)
CHOLEST SERPL-MCNC: 189 MG/DL (ref 0–200)
CO2 SERPL-SCNC: 29 MMOL/L (ref 22–29)
CREAT SERPL-MCNC: 1.37 MG/DL (ref 0.76–1.27)
DEPRECATED RDW RBC AUTO: 44.8 FL (ref 37–54)
EGFRCR SERPLBLD CKD-EPI 2021: 60.5 ML/MIN/1.73
EOSINOPHIL # BLD MANUAL: 0.12 10*3/MM3 (ref 0–0.4)
EOSINOPHIL NFR BLD MANUAL: 3.2 % (ref 0.3–6.2)
ERYTHROCYTE [DISTWIDTH] IN BLOOD BY AUTOMATED COUNT: 13.2 % (ref 12.3–15.4)
GLOBULIN UR ELPH-MCNC: 3.2 GM/DL
GLUCOSE SERPL-MCNC: 87 MG/DL (ref 65–99)
HBA1C MFR BLD: 6 % (ref 4.8–5.6)
HCT VFR BLD AUTO: 48.5 % (ref 37.5–51)
HDLC SERPL-MCNC: 57 MG/DL (ref 40–60)
HGB BLD-MCNC: 15.9 G/DL (ref 13–17.7)
LDLC SERPL CALC-MCNC: 124 MG/DL (ref 0–100)
LDLC/HDLC SERPL: 2.17 {RATIO}
LYMPHOCYTES # BLD MANUAL: 1.67 10*3/MM3 (ref 0.7–3.1)
LYMPHOCYTES NFR BLD MANUAL: 5.3 % (ref 5–12)
MCH RBC QN AUTO: 30.1 PG (ref 26.6–33)
MCHC RBC AUTO-ENTMCNC: 32.8 G/DL (ref 31.5–35.7)
MCV RBC AUTO: 91.9 FL (ref 79–97)
MONOCYTES # BLD: 0.2 10*3/MM3 (ref 0.1–0.9)
NEUTROPHILS # BLD AUTO: 1.71 10*3/MM3 (ref 1.7–7)
NEUTROPHILS NFR BLD MANUAL: 45.3 % (ref 42.7–76)
NRBC BLD AUTO-RTO: 0 /100 WBC (ref 0–0.2)
PLAT MORPH BLD: NORMAL
PLATELET # BLD AUTO: 147 10*3/MM3 (ref 140–450)
PMV BLD AUTO: 12.7 FL (ref 6–12)
POTASSIUM SERPL-SCNC: 4.5 MMOL/L (ref 3.5–5.2)
PROT SERPL-MCNC: 7.2 G/DL (ref 6–8.5)
PSA SERPL-MCNC: 1.07 NG/ML (ref 0–4)
RBC # BLD AUTO: 5.28 10*6/MM3 (ref 4.14–5.8)
RBC MORPH BLD: NORMAL
SMUDGE CELLS BLD QL SMEAR: ABNORMAL
SODIUM SERPL-SCNC: 139 MMOL/L (ref 136–145)
TRIGL SERPL-MCNC: 42 MG/DL (ref 0–150)
TSH SERPL DL<=0.05 MIU/L-ACNC: 2.1 UIU/ML (ref 0.27–4.2)
VARIANT LYMPHS NFR BLD MANUAL: 44.2 % (ref 19.6–45.3)
VLDLC SERPL-MCNC: 8 MG/DL (ref 5–40)
WBC NRBC COR # BLD AUTO: 3.78 10*3/MM3 (ref 3.4–10.8)

## 2025-03-21 PROCEDURE — 80050 GENERAL HEALTH PANEL: CPT

## 2025-03-21 PROCEDURE — G0103 PSA SCREENING: HCPCS

## 2025-03-21 PROCEDURE — 83036 HEMOGLOBIN GLYCOSYLATED A1C: CPT

## 2025-03-21 PROCEDURE — 85007 BL SMEAR W/DIFF WBC COUNT: CPT

## 2025-03-21 PROCEDURE — 99396 PREV VISIT EST AGE 40-64: CPT | Performed by: STUDENT IN AN ORGANIZED HEALTH CARE EDUCATION/TRAINING PROGRAM

## 2025-03-21 PROCEDURE — 80061 LIPID PANEL: CPT

## 2025-03-21 RX ORDER — ATOVAQUONE AND PROGUANIL HYDROCHLORIDE 250; 100 MG/1; MG/1
TABLET, FILM COATED ORAL
Qty: 45 TABLET | Refills: 1 | Status: SHIPPED | OUTPATIENT
Start: 2025-03-21

## 2025-03-21 RX ORDER — ATOVAQUONE AND PROGUANIL HYDROCHLORIDE 250; 100 MG/1; MG/1
TABLET, FILM COATED ORAL
Qty: 45 TABLET | Refills: 0 | Status: SHIPPED | OUTPATIENT
Start: 2025-03-21 | End: 2025-03-21

## 2025-03-21 RX ORDER — HYDROCHLOROTHIAZIDE 25 MG/1
25 TABLET ORAL DAILY
Qty: 90 TABLET | Refills: 3 | Status: SHIPPED | OUTPATIENT
Start: 2025-03-21

## 2025-03-21 RX ORDER — SILDENAFIL CITRATE 20 MG/1
20 TABLET ORAL DAILY PRN
Qty: 30 TABLET | Refills: 5 | Status: SHIPPED | OUTPATIENT
Start: 2025-03-21

## 2025-03-21 NOTE — PROGRESS NOTES
Male Physical Note      Patient Name: Trent Lemus  : 1970   MRN: [id number]     Subjective    Subjective     Chief Complaint:    Chief Complaint   Patient presents with    Annual Exam       History of Present Illness: Trent Lemus is a 54 y.o. male who is here today for their annual health maintenance and physical.      Past Medical History, Social History, Family History and Care Team were all reviewed with patient and updated as appropriate.     Medications:     Current Outpatient Medications:     aspirin 81 MG EC tablet, Take 1 tablet by mouth Daily., Disp: , Rfl:     atovaquone-proguanil (Malarone) 250-100 MG tablet per tablet, Take 1 tablet daily. Start 2 days prior to trip. Continue daily during trip. Continue 7 days after trip., Disp: 45 tablet, Rfl: 1    chlorhexidine (PERIDEX) 0.12 % solution, RINSE WITH 15ML FOR 30 SECONDS AND SPIT, USE TWICE DAILY AFTER BRUSHING AND FLOSSING ., Disp: , Rfl:     hydroCHLOROthiazide 25 MG tablet, Take 1 tablet by mouth Daily., Disp: 90 tablet, Rfl: 3    sildenafil (REVATIO) 20 MG tablet, Take 1 tablet by mouth Daily As Needed (erectile dysfunction)., Disp: 30 tablet, Rfl: 5    valACYclovir (Valtrex) 1000 MG tablet, 2 tablet twice daily for 1 day PRN for new fever blister, Disp: 20 tablet, Rfl: 1    EPINEPHrine (EPIPEN) 0.3 MG/0.3ML solution auto-injector injection, INJECT CONTENTS OF 1 PEN INTO THE MUSCLE AS NEEDED FOR ALLERGIC REACTION (Patient not taking: Reported on 3/21/2025), Disp: , Rfl:     Allergies:   No Known Allergies    Immunizations:  Td/Tdap(Booster Q 10 yrs):  utd  Flu (Yearly):  utd  Colorectal Screening:     Last Completed Colonoscopy            Upcoming       COLORECTAL CANCER SCREENING (COLONOSCOPY - Preferred) Next due on 2016  SCANNED - COLONOSCOPY                             PSA(Over age 50):  ordered     Diet/Physical activity: discussed     PHQ-2 Depression Screening  Little interest or pleasure in doing things? Not  "at all   Feeling down, depressed, or hopeless? Not at all   PHQ-2 Total Score 0          Objective   Objective     Physical Exam:  Vital Signs:   Vitals:    03/21/25 0728   BP: 128/88   BP Location: Right arm   Patient Position: Sitting   Cuff Size: Adult   Pulse: 64   Resp: 20   Temp: 98.6 °F (37 °C)   TempSrc: Temporal   SpO2: 98%   Weight: 76.5 kg (168 lb 9.6 oz)   Height: 177.8 cm (70\")   PainSc: 0-No pain     Body mass index is 24.19 kg/m².     Physical Exam  Constitutional:       General: He is not in acute distress.     Appearance: He is not ill-appearing.   Cardiovascular:      Rate and Rhythm: Normal rate and regular rhythm.   Pulmonary:      Effort: Pulmonary effort is normal.      Breath sounds: Normal breath sounds.   Neurological:      Mental Status: He is alert.   Psychiatric:         Thought Content: Thought content normal.         Procedures    Assessment / Plan      Assessment/Plan:   Diagnoses and all orders for this visit:    1. Encounter for routine adult health examination without abnormal findings (Primary)  -     Comprehensive Metabolic Panel; Future  -     Lipid Panel; Future  -     Hemoglobin A1c; Future  -     CBC & Differential; Future  -     TSH Rfx On Abnormal To Free T4; Future  -     PSA Screen; Future    2. Need for malaria prophylaxis  -     Discontinue: atovaquone-proguanil (Malarone) 250-100 MG tablet per tablet; Take 1 tablet daily. Start 2 days prior to trip. Continue daily during trip. Continue 7 days after trip.  Dispense: 45 tablet; Refill: 0  -     atovaquone-proguanil (Malarone) 250-100 MG tablet per tablet; Take 1 tablet daily. Start 2 days prior to trip. Continue daily during trip. Continue 7 days after trip.  Dispense: 45 tablet; Refill: 1    3. Hypertension  Assessment & Plan:  Slightly low on chlorthalidone  Switched HCTZ  Mildly elevated on HCTZ will follow-up in 3 months    Orders:  -     hydroCHLOROthiazide 25 MG tablet; Take 1 tablet by mouth Daily.  Dispense: 90 " tablet; Refill: 3    4. Screening for malignant neoplasm of prostate  -     PSA Screen; Future    5. Combined arterial insufficiency and corporo-venous occlusive erectile dysfunction  -     sildenafil (REVATIO) 20 MG tablet; Take 1 tablet by mouth Daily As Needed (erectile dysfunction).  Dispense: 30 tablet; Refill: 5           Follow Up:   Return in about 3 months (around 6/21/2025) for Follow-up htn.    Healthcare Maintenance:   Health maintenance counseling included discussion of healthy diet and physical activity, Dental hygiene, and vaccinations.  Trent Lemus voices understanding and acceptance of this advice and will call back with any further questions or concerns. AVS with preventive healthcare tips printed for patient.     Abdias Garcia MD  Family Medicine - [facility] [place]

## 2025-06-20 ENCOUNTER — OFFICE VISIT (OUTPATIENT)
Dept: FAMILY MEDICINE CLINIC | Facility: CLINIC | Age: 55
End: 2025-06-20
Payer: COMMERCIAL

## 2025-06-20 VITALS
HEIGHT: 70 IN | WEIGHT: 164.2 LBS | OXYGEN SATURATION: 98 % | DIASTOLIC BLOOD PRESSURE: 94 MMHG | TEMPERATURE: 97.5 F | SYSTOLIC BLOOD PRESSURE: 138 MMHG | BODY MASS INDEX: 23.51 KG/M2 | HEART RATE: 71 BPM

## 2025-06-20 DIAGNOSIS — R73.03 PREDIABETES: ICD-10-CM

## 2025-06-20 DIAGNOSIS — I10 ESSENTIAL HYPERTENSION: Primary | ICD-10-CM

## 2025-06-20 DIAGNOSIS — T78.40XA ALLERGY, INITIAL ENCOUNTER: ICD-10-CM

## 2025-06-20 RX ORDER — FLUTICASONE PROPIONATE 50 MCG
1 SPRAY, SUSPENSION (ML) NASAL DAILY
Qty: 16 G | Refills: 11 | Status: SHIPPED | OUTPATIENT
Start: 2025-06-20

## 2025-06-20 RX ORDER — FLUTICASONE PROPIONATE 50 MCG
SPRAY, SUSPENSION (ML) NASAL
COMMUNITY
End: 2025-06-20 | Stop reason: SDUPTHER

## 2025-06-20 RX ORDER — CHLORTHALIDONE 25 MG/1
25 TABLET ORAL DAILY
Qty: 90 TABLET | Refills: 3 | Status: SHIPPED | OUTPATIENT
Start: 2025-06-20

## 2025-06-20 RX ORDER — CLOTRIMAZOLE AND BETAMETHASONE DIPROPIONATE 10; .64 MG/G; MG/G
1 CREAM TOPICAL 2 TIMES DAILY
COMMUNITY
Start: 2025-05-16

## 2025-06-20 NOTE — ASSESSMENT & PLAN NOTE
Chronic worsening.  Switch back to Chlorthalidone from hctz as above goal.  He had some fluctuations in blood pressure over time he has been a little overtreated in the past so we will monitor this closely.  I gave him permission to do half a tablet of chlorthalidone if his pressure starts going down

## 2025-06-20 NOTE — PROGRESS NOTES
Established Patient Office Visit        Subjective      Chief Complaint:  Follow-up (3 month follow up on BP)      History of Present Illness: Trent Lemus is a 55 y.o. male who presents for BP. Feeling well but bp regularly above 135/85. Some rhinitis from bathroom remodel.       Patient Active Problem List   Diagnosis    Hypertension    Well adult exam    History of Helicobacter pylori infection    Seasonal allergies    Screening for prostate cancer    Combined arterial insufficiency and corporo-venous occlusive erectile dysfunction    Prediabetes    Arthralgia of knee    Left lateral knee pain    Abnormal blood creatinine level    Vitreous hemorrhage of right eye         Current Outpatient Medications:     aspirin 81 MG EC tablet, Take 1 tablet by mouth Daily., Disp: , Rfl:     atovaquone-proguanil (Malarone) 250-100 MG tablet per tablet, Take 1 tablet daily. Start 2 days prior to trip. Continue daily during trip. Continue 7 days after trip., Disp: 45 tablet, Rfl: 1    chlorhexidine (PERIDEX) 0.12 % solution, RINSE WITH 15ML FOR 30 SECONDS AND SPIT, USE TWICE DAILY AFTER BRUSHING AND FLOSSING ., Disp: , Rfl:     clotrimazole-betamethasone (LOTRISONE) 1-0.05 % cream, Apply 1 Application topically to the appropriate area as directed 2 (Two) Times a Day., Disp: , Rfl:     EPINEPHrine (EPIPEN) 0.3 MG/0.3ML solution auto-injector injection, , Disp: , Rfl:     fluticasone (FLONASE) 50 MCG/ACT nasal spray, Administer 1 spray into the nostril(s) as directed by provider Daily., Disp: 16 g, Rfl: 11    sildenafil (REVATIO) 20 MG tablet, Take 1 tablet by mouth Daily As Needed (erectile dysfunction)., Disp: 30 tablet, Rfl: 5    valACYclovir (Valtrex) 1000 MG tablet, 2 tablet twice daily for 1 day PRN for new fever blister, Disp: 20 tablet, Rfl: 1    chlorthalidone (HYGROTON) 25 MG tablet, Take 1 tablet by mouth Daily., Disp: 90 tablet, Rfl: 3       Objective     Physical Exam:   Vital Signs:   /94   Pulse 71   Temp  "97.5 °F (36.4 °C) (Infrared)   Ht 177.8 cm (70\")   Wt 74.5 kg (164 lb 3.2 oz)   SpO2 98%   BMI 23.56 kg/m²      Physical Exam  Constitutional:       General: He is not in acute distress.  Well-appearing.  Neurological:      Mental Status: He is alert.   Psychiatric:         Thought Content: Thought content normal.            Assessment / Plan      Assessment/Plan:   Diagnoses and all orders for this visit:    1. Hypertension (Primary)  Assessment & Plan:  Chronic worsening.  Switch back to Chlorthalidone from hctz as above goal.  He had some fluctuations in blood pressure over time he has been a little overtreated in the past so we will monitor this closely.  I gave him permission to do half a tablet of chlorthalidone if his pressure starts going down      Orders:  -     chlorthalidone (HYGROTON) 25 MG tablet; Take 1 tablet by mouth Daily.  Dispense: 90 tablet; Refill: 3  -     Comprehensive Metabolic Panel; Future    2. Prediabetes  Assessment & Plan:  Continue healthy diet  Get A1c prior to follow-up    Orders:  -     Hemoglobin A1c; Future    3. Allergy, initial encounter  -     fluticasone (FLONASE) 50 MCG/ACT nasal spray; Administer 1 spray into the nostril(s) as directed by provider Daily.  Dispense: 16 g; Refill: 11           Follow Up:   Return in about 3 months (around 9/20/2025) for Follow-up.    MDM:     Abdias Garcia MD  Family Medicine - [facility] [place]  "